# Patient Record
Sex: MALE | Race: WHITE | NOT HISPANIC OR LATINO | Employment: FULL TIME | URBAN - METROPOLITAN AREA
[De-identification: names, ages, dates, MRNs, and addresses within clinical notes are randomized per-mention and may not be internally consistent; named-entity substitution may affect disease eponyms.]

---

## 2022-07-26 ENCOUNTER — TELEPHONE (OUTPATIENT)
Dept: HEALTH INFORMATION MANAGEMENT | Facility: OTHER | Age: 37
End: 2022-07-26

## 2022-07-26 SDOH — ECONOMIC STABILITY: TRANSPORTATION INSECURITY
IN THE PAST 12 MONTHS, HAS THE LACK OF TRANSPORTATION KEPT YOU FROM MEDICAL APPOINTMENTS OR FROM GETTING MEDICATIONS?: NO

## 2022-07-26 SDOH — ECONOMIC STABILITY: HOUSING INSECURITY
IN THE LAST 12 MONTHS, WAS THERE A TIME WHEN YOU DID NOT HAVE A STEADY PLACE TO SLEEP OR SLEPT IN A SHELTER (INCLUDING NOW)?: NO

## 2022-07-26 SDOH — ECONOMIC STABILITY: FOOD INSECURITY: WITHIN THE PAST 12 MONTHS, THE FOOD YOU BOUGHT JUST DIDN'T LAST AND YOU DIDN'T HAVE MONEY TO GET MORE.: NEVER TRUE

## 2022-07-26 SDOH — ECONOMIC STABILITY: INCOME INSECURITY: IN THE LAST 12 MONTHS, WAS THERE A TIME WHEN YOU WERE NOT ABLE TO PAY THE MORTGAGE OR RENT ON TIME?: NO

## 2022-07-26 SDOH — ECONOMIC STABILITY: TRANSPORTATION INSECURITY
IN THE PAST 12 MONTHS, HAS LACK OF RELIABLE TRANSPORTATION KEPT YOU FROM MEDICAL APPOINTMENTS, MEETINGS, WORK OR FROM GETTING THINGS NEEDED FOR DAILY LIVING?: NO

## 2022-07-26 SDOH — HEALTH STABILITY: PHYSICAL HEALTH: ON AVERAGE, HOW MANY DAYS PER WEEK DO YOU ENGAGE IN MODERATE TO STRENUOUS EXERCISE (LIKE A BRISK WALK)?: 5 DAYS

## 2022-07-26 SDOH — ECONOMIC STABILITY: HOUSING INSECURITY: IN THE LAST 12 MONTHS, HOW MANY PLACES HAVE YOU LIVED?: 2

## 2022-07-26 SDOH — ECONOMIC STABILITY: FOOD INSECURITY: WITHIN THE PAST 12 MONTHS, YOU WORRIED THAT YOUR FOOD WOULD RUN OUT BEFORE YOU GOT MONEY TO BUY MORE.: NEVER TRUE

## 2022-07-26 SDOH — ECONOMIC STABILITY: TRANSPORTATION INSECURITY
IN THE PAST 12 MONTHS, HAS LACK OF TRANSPORTATION KEPT YOU FROM MEETINGS, WORK, OR FROM GETTING THINGS NEEDED FOR DAILY LIVING?: NO

## 2022-07-26 SDOH — ECONOMIC STABILITY: INCOME INSECURITY: HOW HARD IS IT FOR YOU TO PAY FOR THE VERY BASICS LIKE FOOD, HOUSING, MEDICAL CARE, AND HEATING?: NOT HARD AT ALL

## 2022-07-26 SDOH — HEALTH STABILITY: MENTAL HEALTH
STRESS IS WHEN SOMEONE FEELS TENSE, NERVOUS, ANXIOUS, OR CAN'T SLEEP AT NIGHT BECAUSE THEIR MIND IS TROUBLED. HOW STRESSED ARE YOU?: ONLY A LITTLE

## 2022-07-26 SDOH — HEALTH STABILITY: PHYSICAL HEALTH: ON AVERAGE, HOW MANY MINUTES DO YOU ENGAGE IN EXERCISE AT THIS LEVEL?: 40 MIN

## 2022-07-26 ASSESSMENT — SOCIAL DETERMINANTS OF HEALTH (SDOH)
WITHIN THE PAST 12 MONTHS, YOU WORRIED THAT YOUR FOOD WOULD RUN OUT BEFORE YOU GOT THE MONEY TO BUY MORE: NEVER TRUE
HOW HARD IS IT FOR YOU TO PAY FOR THE VERY BASICS LIKE FOOD, HOUSING, MEDICAL CARE, AND HEATING?: NOT HARD AT ALL
IN A TYPICAL WEEK, HOW MANY TIMES DO YOU TALK ON THE PHONE WITH FAMILY, FRIENDS, OR NEIGHBORS?: THREE TIMES A WEEK
HOW OFTEN DO YOU ATTENT MEETINGS OF THE CLUB OR ORGANIZATION YOU BELONG TO?: NEVER
HOW OFTEN DO YOU GET TOGETHER WITH FRIENDS OR RELATIVES?: NEVER
DO YOU BELONG TO ANY CLUBS OR ORGANIZATIONS SUCH AS CHURCH GROUPS UNIONS, FRATERNAL OR ATHLETIC GROUPS, OR SCHOOL GROUPS?: NO
HOW OFTEN DO YOU ATTEND CHURCH OR RELIGIOUS SERVICES?: NEVER
HOW OFTEN DO YOU HAVE SIX OR MORE DRINKS ON ONE OCCASION: NEVER
IN A TYPICAL WEEK, HOW MANY TIMES DO YOU TALK ON THE PHONE WITH FAMILY, FRIENDS, OR NEIGHBORS?: THREE TIMES A WEEK
HOW MANY DRINKS CONTAINING ALCOHOL DO YOU HAVE ON A TYPICAL DAY WHEN YOU ARE DRINKING: PATIENT DOES NOT DRINK
HOW OFTEN DO YOU HAVE A DRINK CONTAINING ALCOHOL: NEVER
DO YOU BELONG TO ANY CLUBS OR ORGANIZATIONS SUCH AS CHURCH GROUPS UNIONS, FRATERNAL OR ATHLETIC GROUPS, OR SCHOOL GROUPS?: NO
HOW OFTEN DO YOU ATTENT MEETINGS OF THE CLUB OR ORGANIZATION YOU BELONG TO?: NEVER
HOW OFTEN DO YOU ATTEND CHURCH OR RELIGIOUS SERVICES?: NEVER
HOW OFTEN DO YOU GET TOGETHER WITH FRIENDS OR RELATIVES?: NEVER

## 2022-07-26 ASSESSMENT — LIFESTYLE VARIABLES
HOW MANY STANDARD DRINKS CONTAINING ALCOHOL DO YOU HAVE ON A TYPICAL DAY: PATIENT DOES NOT DRINK
HOW OFTEN DO YOU HAVE SIX OR MORE DRINKS ON ONE OCCASION: NEVER
AUDIT-C TOTAL SCORE: 0
SKIP TO QUESTIONS 9-10: 1
HOW OFTEN DO YOU HAVE A DRINK CONTAINING ALCOHOL: NEVER

## 2022-07-27 ENCOUNTER — OFFICE VISIT (OUTPATIENT)
Dept: MEDICAL GROUP | Facility: PHYSICIAN GROUP | Age: 37
End: 2022-07-27
Payer: COMMERCIAL

## 2022-07-27 VITALS
BODY MASS INDEX: 26.51 KG/M2 | HEIGHT: 69 IN | OXYGEN SATURATION: 96 % | HEART RATE: 70 BPM | WEIGHT: 179 LBS | TEMPERATURE: 98.6 F | SYSTOLIC BLOOD PRESSURE: 114 MMHG | DIASTOLIC BLOOD PRESSURE: 72 MMHG | RESPIRATION RATE: 16 BRPM

## 2022-07-27 DIAGNOSIS — Z00.00 WELL ADULT EXAM: ICD-10-CM

## 2022-07-27 DIAGNOSIS — L70.0 ACNE VULGARIS: ICD-10-CM

## 2022-07-27 DIAGNOSIS — Z12.9 CANCER SCREENING: ICD-10-CM

## 2022-07-27 DIAGNOSIS — Z76.89 ENCOUNTER TO ESTABLISH CARE WITH NEW DOCTOR: ICD-10-CM

## 2022-07-27 DIAGNOSIS — F40.243 ANXIETY WITH FLYING: ICD-10-CM

## 2022-07-27 DIAGNOSIS — N20.0 KIDNEY STONE: ICD-10-CM

## 2022-07-27 PROCEDURE — 99204 OFFICE O/P NEW MOD 45 MIN: CPT | Performed by: INTERNAL MEDICINE

## 2022-07-27 RX ORDER — LORAZEPAM 0.5 MG/1
0.5 TABLET ORAL
Qty: 15 TABLET | Refills: 0 | Status: SHIPPED | OUTPATIENT
Start: 2022-07-27 | End: 2022-08-11

## 2022-07-27 RX ORDER — ERYTHROMYCIN 20 MG/ML
SOLUTION TOPICAL
Qty: 60 ML | Refills: 2 | Status: SHIPPED | OUTPATIENT
Start: 2022-07-27 | End: 2023-02-08

## 2022-07-27 RX ORDER — TAMSULOSIN HYDROCHLORIDE 0.4 MG/1
0.4 CAPSULE ORAL DAILY
COMMUNITY
Start: 2021-03-18 | End: 2022-07-27

## 2022-07-27 ASSESSMENT — FIBROSIS 4 INDEX: FIB4 SCORE: 0.98

## 2022-07-27 ASSESSMENT — PATIENT HEALTH QUESTIONNAIRE - PHQ9: CLINICAL INTERPRETATION OF PHQ2 SCORE: 0

## 2022-07-27 NOTE — ASSESSMENT & PLAN NOTE
Chronic condition.  The patient used to take lorazepam before flying.  He will go to [place] very soon.  The patient is requesting a refill

## 2022-07-27 NOTE — ASSESSMENT & PLAN NOTE
This is intermittent condition.  Patient reported he would get acne breakouts on his face typically after eating chocolate

## 2022-07-27 NOTE — PROGRESS NOTES
"PRIMARY CARE CLINIC VISIT  Chief Complaint   Patient presents with   • New Patient     Establish care  Refill for lorazepam  Acne    History of Present Illness     Encounter to establish care with new doctor  Patient presents today to establish care with new primary care provider    Acne vulgaris  This is intermittent condition.  Patient reported he would get acne breakouts on his face typically after eating chocolate    Anxiety with flying  Chronic condition.  The patient used to take lorazepam before flying.  He will go to [place] very soon.  The patient is requesting a refill    Kidney stone  Patient with history of kidney stone.  Currently the patient asymptomatic.      No current outpatient medications on file prior to visit.     No current facility-administered medications on file prior to visit.        Allergies: Patient has no known allergies.    ROS  As per HPI above. All other systems reviewed and negative.      Past Medical, Social, and Family history reviewed and updated in EPIC     Objective     /72 (BP Location: Right arm, Patient Position: Sitting, BP Cuff Size: Adult)   Pulse 70   Temp 37 °C (98.6 °F) (Temporal)   Resp 16   Ht 1.753 m (5' 9\")   Wt 81.2 kg (179 lb)   SpO2 96%    Body mass index is 26.43 kg/m².    General: alert in no apparent distress.  Cardiovascular: regular rate and rhythm  Pulmonary: lungs : no wheezing   Gastrointestinal: BS present. No obvious mass noted          Assessment and Plan     1. Encounter to establish care with new doctor    2. Anxiety with flying  Chronic condition.  Stable.  Refill for lorazepam to take 1 hour before flying.   reviewed, appropriate.  Patient signed the consent form for controlled substance agreement today.  - LORazepam (ATIVAN) 0.5 MG Tab; Take 1 Tablet by mouth 1 time a day as needed for Anxiety for up to 15 days.  Dispense: 15 Tablet; Refill: 0  - Controlled Substance Treatment Agreement    I have reviewed the medical records and have " determined that a controlled substance treatment is medically indicated:    I have reviewed pt's prescription history as maintained by the [place] Prescription Monitoring Program.     Given the above, I believe the benefits of controlled substance therapy outweigh the risks.   Potential side effects of the medication discussed with the patient including but not limited to: Sedation, drowsiness, depression, headaches, constipation, diarrhea, dry mouth, impaired coordination, fatigue, memory impairment, irritability, cognitive disorder, insomnia, lightheadedness, changes in bowel movement, nausea/vomiting, tachycardia, confusion, blurred vision, hypotension, syncope, etc.  Also discussed with the patient regarding potential drug interactions with other medications  Accordingly, I have discussed the risk and benefits, treatment plan, and alternative therapies with the patient.     3. Kidney stone  Currently the patient asymptomatic.  Advised the patient stay well-hydrated.  Continue to monitor  4. Acne vulgaris  Chronic condition.  Prescribed erythromycin ointment applied to affected area twice daily as needed.    Other orders  - erythromycin with ethanol (THERAMYCIN) 2 % external solution; Apply to affected areas bid.  Dispense: 60 mL; Refill: 2      5. Cancer screening  Patient also interested in the [employer] [place] project.  Referral submitted today.- Referral to Genetic Research Studies    6. Well adult exam  - HEMOGLOBIN A1C; Future  - ALANINE AMINO-TRANS; Future  - Basic Metabolic Panel; Future  - CBC WITHOUT DIFFERENTIAL; Future  - Lipid Profile; Future  - TSH; Future  Routine lab work ordered.  Advised the patient to follow-up a few days after lab test done.                      Healthcare Maintenance     There are no preventive care reminders to display for this patient.            Please note that this dictation was created using voice recognition software. I have made every reasonable attempt to correct  obvious errors, but I expect that there are errors of grammar and possibly content that I did not discover before finalizing the note.    Robert Fernandez MD  Internal Medicine  [facility]

## 2023-02-07 ENCOUNTER — HOSPITAL ENCOUNTER (OUTPATIENT)
Dept: LAB | Facility: MEDICAL CENTER | Age: 38
End: 2023-02-07
Attending: INTERNAL MEDICINE
Payer: COMMERCIAL

## 2023-02-07 DIAGNOSIS — Z00.00 WELL ADULT EXAM: ICD-10-CM

## 2023-02-07 LAB
ALT SERPL-CCNC: 16 U/L (ref 2–50)
ANION GAP SERPL CALC-SCNC: 10 MMOL/L (ref 7–16)
BUN SERPL-MCNC: 12 MG/DL (ref 8–22)
CALCIUM SERPL-MCNC: 9.9 MG/DL (ref 8.5–10.5)
CHLORIDE SERPL-SCNC: 104 MMOL/L (ref 96–112)
CHOLEST SERPL-MCNC: 210 MG/DL (ref 100–199)
CO2 SERPL-SCNC: 26 MMOL/L (ref 20–33)
CREAT SERPL-MCNC: 0.89 MG/DL (ref 0.5–1.4)
EST. AVERAGE GLUCOSE BLD GHB EST-MCNC: 123 MG/DL
FASTING STATUS PATIENT QL REPORTED: NORMAL
GFR SERPLBLD CREATININE-BSD FMLA CKD-EPI: 112 ML/MIN/1.73 M 2
GLUCOSE SERPL-MCNC: 97 MG/DL (ref 65–99)
HBA1C MFR BLD: 5.9 % (ref 4–5.6)
HDLC SERPL-MCNC: 46 MG/DL
LDLC SERPL CALC-MCNC: 143 MG/DL
POTASSIUM SERPL-SCNC: 4.9 MMOL/L (ref 3.6–5.5)
SODIUM SERPL-SCNC: 140 MMOL/L (ref 135–145)
TRIGL SERPL-MCNC: 104 MG/DL (ref 0–149)
TSH SERPL DL<=0.005 MIU/L-ACNC: 1.23 UIU/ML (ref 0.38–5.33)

## 2023-02-07 PROCEDURE — 80048 BASIC METABOLIC PNL TOTAL CA: CPT

## 2023-02-07 PROCEDURE — 80061 LIPID PANEL: CPT

## 2023-02-07 PROCEDURE — 84443 ASSAY THYROID STIM HORMONE: CPT

## 2023-02-07 PROCEDURE — 83036 HEMOGLOBIN GLYCOSYLATED A1C: CPT

## 2023-02-07 PROCEDURE — 36415 COLL VENOUS BLD VENIPUNCTURE: CPT

## 2023-02-07 PROCEDURE — 84460 ALANINE AMINO (ALT) (SGPT): CPT

## 2023-02-08 ENCOUNTER — OFFICE VISIT (OUTPATIENT)
Dept: MEDICAL GROUP | Facility: PHYSICIAN GROUP | Age: 38
End: 2023-02-08
Payer: COMMERCIAL

## 2023-02-08 VITALS
DIASTOLIC BLOOD PRESSURE: 62 MMHG | HEIGHT: 69 IN | TEMPERATURE: 99 F | WEIGHT: 173 LBS | HEART RATE: 86 BPM | OXYGEN SATURATION: 96 % | BODY MASS INDEX: 25.62 KG/M2 | SYSTOLIC BLOOD PRESSURE: 114 MMHG | RESPIRATION RATE: 16 BRPM

## 2023-02-08 DIAGNOSIS — R73.03 PREDIABETES: ICD-10-CM

## 2023-02-08 DIAGNOSIS — E78.5 DYSLIPIDEMIA: ICD-10-CM

## 2023-02-08 DIAGNOSIS — F40.243 ANXIETY WITH FLYING: ICD-10-CM

## 2023-02-08 DIAGNOSIS — N20.0 KIDNEY STONE: ICD-10-CM

## 2023-02-08 DIAGNOSIS — H02.9 LESION OF LEFT UPPER EYELID: ICD-10-CM

## 2023-02-08 DIAGNOSIS — L70.0 ACNE VULGARIS: ICD-10-CM

## 2023-02-08 PROBLEM — H57.9 EYE LESION: Status: ACTIVE | Noted: 2023-02-08

## 2023-02-08 PROCEDURE — 99214 OFFICE O/P EST MOD 30 MIN: CPT | Performed by: INTERNAL MEDICINE

## 2023-02-08 ASSESSMENT — PATIENT HEALTH QUESTIONNAIRE - PHQ9: CLINICAL INTERPRETATION OF PHQ2 SCORE: 0

## 2023-02-08 ASSESSMENT — FIBROSIS 4 INDEX: FIB4 SCORE: 1.01

## 2023-02-09 NOTE — ASSESSMENT & PLAN NOTE
Chronic condition uncontrolled    Lab test results discussed with the patient.  Recommended low-fat low-cholesterol diet.  Recommend to repeat the blood test again approximately 3 to 4 months.

## 2023-02-09 NOTE — ASSESSMENT & PLAN NOTE
Chronic stable condition.  The patient takes lorazepam as needed for flying.   Symptoms are stable at the present time.

## 2023-02-09 NOTE — PROGRESS NOTES
PRIMARY CARE CLINIC VISIT    Chief complaint:    Lesion left upper eyelid  Follow-up hyperlipidemia and prediabetes  Acne  Anxiety  Kidney stone    History of Present Illness     Lesion of left upper eyelid  This is a new condition.  The patient and care the patient was not sure the duration  He denies fever chills pain or discomfort.  He denies change in vision.  Patient denies recent trauma or injury.    Dyslipidemia  Chronic condition uncontrolled    Lab test results discussed with the patient.  Recommended low-fat low-cholesterol diet.  Recommend to repeat the blood test again approximately 3 to 4 months.    Prediabetes  Chronic condition noted with recent blood test.  Results discussed with the patient.  Recommended low sweet low-carb diet and regular exercise activity.  Patient to repeat blood test in 3 to 4 months.      Acne vulgaris  This is a chronic condition.  The patient no longer use erythromycin for his symptoms well controlled    Anxiety with flying  Chronic stable condition.  The patient takes lorazepam as needed for flying.   Symptoms are stable at the present time.    Kidney stone  This is a chronic and stable condition.   Patient denied recent flareup.    No current outpatient medications on file prior to visit.     No current facility-administered medications on file prior to visit.        Allergies: Patient has no known allergies.    No current Epic-ordered outpatient medications on file.     No current Epic-ordered facility-administered medications on file.       No past medical history on file.    Past Surgical History:   Procedure Laterality Date    CHOLECYSTECTOMY         Family History   Problem Relation Age of Onset    Cancer Mother         ovarian    Cancer Paternal Uncle        Social History     Tobacco Use   Smoking Status Former    Packs/day: 0.50    Types: Cigarettes    Quit date:     Years since quittin.1   Smokeless Tobacco Never       Social History     Substance and  "Sexual Activity   Alcohol Use Not Currently       Review of systems.  As per HPI above. All other systems reviewed and negative.      Past Medical, Social, and Family history reviewed and updated in EPIC     Objective     /62 (BP Location: Left arm, Patient Position: Sitting, BP Cuff Size: Adult)   Pulse 86   Temp 37.2 °C (99 °F) (Temporal)   Resp 16   Ht 1.753 m (5' 9\")   Wt 78.5 kg (173 lb)   SpO2 96%    Body mass index is 25.55 kg/m².    General: alert in no apparent distress.  Cardiovascular: regular rate and rhythm  Pulmonary: lungs : no wheezing   Gastrointestinal: BS present. No obvious mass noted  Left upper eyelid there is a papular lesion slightly raised measuring approximately 4 x 5 mm in size.  No fluctuance or discharge noted.      Lab Results   Component Value Date/Time    HBA1C 5.9 (H) 02/07/2023 07:03 AM       Lab Results   Component Value Date/Time    HEMOGLOBIN 13.1 03/18/2021 10:15 PM    HEMATOCRIT 39.3 03/18/2021 10:15 PM    MCV 92 03/18/2021 10:15 PM    PLATELETCT 234 03/18/2021 10:15 PM         Lab Results   Component Value Date/Time    SODIUM 140 02/07/2023 07:03 AM    POTASSIUM 4.9 02/07/2023 07:03 AM    GLUCOSE 97 02/07/2023 07:03 AM    BUN 12 02/07/2023 07:03 AM    CREATININE 0.89 02/07/2023 07:03 AM       Lab Results   Component Value Date/Time    CHOLSTRLTOT 210 (H) 02/07/2023 07:03 AM     (H) 02/07/2023 07:03 AM    HDL 46 02/07/2023 07:03 AM    TRIGLYCERIDE 104 02/07/2023 07:03 AM       Lab Results   Component Value Date/Time    ALTSGPT 16 02/07/2023 07:03 AM             Assessment and Plan     1. Lesion of left upper eyelid  This is a new condition.  The cause is unclear needing further investigation.  Rule out neoplastic causes    - Referral to Ophthalmology for consultation    2. Prediabetes  Chronic condition.  Uncontrolled.  Recommend diet and exercise.  Recommend low sweet low-carb diet.  Recommend follow-up in 3 months to repeat the blood test.  - Basic " Metabolic Panel; Future  - HEMOGLOBIN A1C; Future    3. Dyslipidemia  Chronic condition.  Uncontrolled.  Lipid panel result discussed with patient.  Recommended low-fat low-cholesterol diet.  Recommend patient to start regular exercise activity.  Recommend follow-up in 3 months to repeat the blood test for follow-up trends.    4. Acne vulgaris  Chronic stable condition.  Currently no significant acne is noted on exam.  Continue to monitor.    5. Anxiety with flying  Chronic stable condition.  Patient may continue to take lorazepam 1 hour before flying.  Continue to monitor.    6. Kidney stone  Chronic stable condition.  Patient currently asymptomatic.  Advised the patient to be sure to stay well-hydrated.  Continue to monitor.                    Please note that this dictation was created using voice recognition software. I have made every reasonable attempt to correct obvious errors, but I expect that there are errors of grammar and possibly content that I did not discover before finalizing the note.    Robert Fernandez MD  Internal Medicine  [facility]

## 2023-02-09 NOTE — ASSESSMENT & PLAN NOTE
This is a new condition.  The patient and care the patient was not sure the duration  He denies fever chills pain or discomfort.  He denies change in vision.  Patient denies recent trauma or injury.

## 2023-02-09 NOTE — ASSESSMENT & PLAN NOTE
This is a chronic condition.  The patient no longer use erythromycin for his symptoms well controlled

## 2023-02-09 NOTE — ASSESSMENT & PLAN NOTE
Chronic condition noted with recent blood test.  Results discussed with the patient.  Recommended low sweet low-carb diet and regular exercise activity.  Patient to repeat blood test in 3 to 4 months.

## 2023-07-11 ENCOUNTER — HOSPITAL ENCOUNTER (OUTPATIENT)
Dept: LAB | Facility: MEDICAL CENTER | Age: 38
End: 2023-07-11
Attending: INTERNAL MEDICINE
Payer: COMMERCIAL

## 2023-07-11 DIAGNOSIS — R73.03 PREDIABETES: ICD-10-CM

## 2023-07-11 DIAGNOSIS — E78.5 DYSLIPIDEMIA: ICD-10-CM

## 2023-07-11 LAB
ANION GAP SERPL CALC-SCNC: 9 MMOL/L (ref 7–16)
BUN SERPL-MCNC: 18 MG/DL (ref 8–22)
CALCIUM SERPL-MCNC: 9.5 MG/DL (ref 8.5–10.5)
CHLORIDE SERPL-SCNC: 102 MMOL/L (ref 96–112)
CHOLEST SERPL-MCNC: 207 MG/DL (ref 100–199)
CO2 SERPL-SCNC: 27 MMOL/L (ref 20–33)
CREAT SERPL-MCNC: 0.96 MG/DL (ref 0.5–1.4)
EST. AVERAGE GLUCOSE BLD GHB EST-MCNC: 126 MG/DL
FASTING STATUS PATIENT QL REPORTED: NORMAL
GFR SERPLBLD CREATININE-BSD FMLA CKD-EPI: 103 ML/MIN/1.73 M 2
GLUCOSE SERPL-MCNC: 107 MG/DL (ref 65–99)
HBA1C MFR BLD: 6 % (ref 4–5.6)
HDLC SERPL-MCNC: 48 MG/DL
LDLC SERPL CALC-MCNC: 145 MG/DL
POTASSIUM SERPL-SCNC: 4.6 MMOL/L (ref 3.6–5.5)
SODIUM SERPL-SCNC: 138 MMOL/L (ref 135–145)
TRIGL SERPL-MCNC: 71 MG/DL (ref 0–149)

## 2023-07-11 PROCEDURE — 80048 BASIC METABOLIC PNL TOTAL CA: CPT

## 2023-07-11 PROCEDURE — 80061 LIPID PANEL: CPT

## 2023-07-11 PROCEDURE — 36415 COLL VENOUS BLD VENIPUNCTURE: CPT

## 2023-07-11 PROCEDURE — 83036 HEMOGLOBIN GLYCOSYLATED A1C: CPT

## 2023-08-09 DIAGNOSIS — E78.5 DYSLIPIDEMIA: Chronic | ICD-10-CM

## 2023-08-09 DIAGNOSIS — Z13.0 SCREENING FOR DEFICIENCY ANEMIA: ICD-10-CM

## 2023-08-09 DIAGNOSIS — R73.03 PREDIABETES: Chronic | ICD-10-CM

## 2023-08-10 ENCOUNTER — HOSPITAL ENCOUNTER (OUTPATIENT)
Dept: LAB | Facility: MEDICAL CENTER | Age: 38
End: 2023-08-10
Attending: INTERNAL MEDICINE
Payer: COMMERCIAL

## 2023-08-10 DIAGNOSIS — R73.03 PREDIABETES: Chronic | ICD-10-CM

## 2023-08-10 DIAGNOSIS — Z13.0 SCREENING FOR DEFICIENCY ANEMIA: ICD-10-CM

## 2023-08-10 DIAGNOSIS — E78.5 DYSLIPIDEMIA: Chronic | ICD-10-CM

## 2023-08-10 LAB
ALBUMIN SERPL BCP-MCNC: 4.6 G/DL (ref 3.2–4.9)
ALBUMIN/GLOB SERPL: 1.7 G/DL
ALP SERPL-CCNC: 54 U/L (ref 30–99)
ALT SERPL-CCNC: 15 U/L (ref 2–50)
ANION GAP SERPL CALC-SCNC: 12 MMOL/L (ref 7–16)
AST SERPL-CCNC: 21 U/L (ref 12–45)
BASOPHILS # BLD AUTO: 1.1 % (ref 0–1.8)
BASOPHILS # BLD: 0.06 K/UL (ref 0–0.12)
BILIRUB SERPL-MCNC: 0.5 MG/DL (ref 0.1–1.5)
BUN SERPL-MCNC: 17 MG/DL (ref 8–22)
CALCIUM ALBUM COR SERPL-MCNC: 9.4 MG/DL (ref 8.5–10.5)
CALCIUM SERPL-MCNC: 9.9 MG/DL (ref 8.5–10.5)
CHLORIDE SERPL-SCNC: 101 MMOL/L (ref 96–112)
CHOLEST SERPL-MCNC: 191 MG/DL (ref 100–199)
CO2 SERPL-SCNC: 25 MMOL/L (ref 20–33)
CREAT SERPL-MCNC: 1.05 MG/DL (ref 0.5–1.4)
EOSINOPHIL # BLD AUTO: 0.06 K/UL (ref 0–0.51)
EOSINOPHIL NFR BLD: 1.1 % (ref 0–6.9)
ERYTHROCYTE [DISTWIDTH] IN BLOOD BY AUTOMATED COUNT: 42.7 FL (ref 35.9–50)
EST. AVERAGE GLUCOSE BLD GHB EST-MCNC: 123 MG/DL
FASTING STATUS PATIENT QL REPORTED: NORMAL
GFR SERPLBLD CREATININE-BSD FMLA CKD-EPI: 92 ML/MIN/1.73 M 2
GLOBULIN SER CALC-MCNC: 2.7 G/DL (ref 1.9–3.5)
GLUCOSE SERPL-MCNC: 97 MG/DL (ref 65–99)
HBA1C MFR BLD: 5.9 % (ref 4–5.6)
HCT VFR BLD AUTO: 47.5 % (ref 42–52)
HDLC SERPL-MCNC: 48 MG/DL
HGB BLD-MCNC: 15.7 G/DL (ref 14–18)
IMM GRANULOCYTES # BLD AUTO: 0.03 K/UL (ref 0–0.11)
IMM GRANULOCYTES NFR BLD AUTO: 0.6 % (ref 0–0.9)
LDLC SERPL CALC-MCNC: 133 MG/DL
LYMPHOCYTES # BLD AUTO: 2.36 K/UL (ref 1–4.8)
LYMPHOCYTES NFR BLD: 44.1 % (ref 22–41)
MCH RBC QN AUTO: 30.7 PG (ref 27–33)
MCHC RBC AUTO-ENTMCNC: 33.1 G/DL (ref 32.3–36.5)
MCV RBC AUTO: 93 FL (ref 81.4–97.8)
MONOCYTES # BLD AUTO: 0.59 K/UL (ref 0–0.85)
MONOCYTES NFR BLD AUTO: 11 % (ref 0–13.4)
NEUTROPHILS # BLD AUTO: 2.25 K/UL (ref 1.82–7.42)
NEUTROPHILS NFR BLD: 42.1 % (ref 44–72)
NRBC # BLD AUTO: 0 K/UL
NRBC BLD-RTO: 0 /100 WBC (ref 0–0.2)
PLATELET # BLD AUTO: 257 K/UL (ref 164–446)
PMV BLD AUTO: 10.7 FL (ref 9–12.9)
POTASSIUM SERPL-SCNC: 4.7 MMOL/L (ref 3.6–5.5)
PROT SERPL-MCNC: 7.3 G/DL (ref 6–8.2)
RBC # BLD AUTO: 5.11 M/UL (ref 4.7–6.1)
SODIUM SERPL-SCNC: 138 MMOL/L (ref 135–145)
TRIGL SERPL-MCNC: 48 MG/DL (ref 0–149)
WBC # BLD AUTO: 5.4 K/UL (ref 4.8–10.8)

## 2023-08-10 PROCEDURE — 85025 COMPLETE CBC W/AUTO DIFF WBC: CPT

## 2023-08-10 PROCEDURE — 80053 COMPREHEN METABOLIC PANEL: CPT

## 2023-08-10 PROCEDURE — 36415 COLL VENOUS BLD VENIPUNCTURE: CPT

## 2023-08-10 PROCEDURE — 80061 LIPID PANEL: CPT

## 2023-08-10 PROCEDURE — 83036 HEMOGLOBIN GLYCOSYLATED A1C: CPT

## 2023-09-02 SDOH — ECONOMIC STABILITY: FOOD INSECURITY: WITHIN THE PAST 12 MONTHS, THE FOOD YOU BOUGHT JUST DIDN'T LAST AND YOU DIDN'T HAVE MONEY TO GET MORE.: NEVER TRUE

## 2023-09-02 SDOH — HEALTH STABILITY: PHYSICAL HEALTH: ON AVERAGE, HOW MANY MINUTES DO YOU ENGAGE IN EXERCISE AT THIS LEVEL?: 40 MIN

## 2023-09-02 SDOH — ECONOMIC STABILITY: FOOD INSECURITY: WITHIN THE PAST 12 MONTHS, YOU WORRIED THAT YOUR FOOD WOULD RUN OUT BEFORE YOU GOT MONEY TO BUY MORE.: NEVER TRUE

## 2023-09-02 SDOH — ECONOMIC STABILITY: HOUSING INSECURITY: IN THE LAST 12 MONTHS, HOW MANY PLACES HAVE YOU LIVED?: 1

## 2023-09-02 SDOH — ECONOMIC STABILITY: INCOME INSECURITY: IN THE LAST 12 MONTHS, WAS THERE A TIME WHEN YOU WERE NOT ABLE TO PAY THE MORTGAGE OR RENT ON TIME?: NO

## 2023-09-02 SDOH — HEALTH STABILITY: PHYSICAL HEALTH: ON AVERAGE, HOW MANY DAYS PER WEEK DO YOU ENGAGE IN MODERATE TO STRENUOUS EXERCISE (LIKE A BRISK WALK)?: 4 DAYS

## 2023-09-02 SDOH — ECONOMIC STABILITY: INCOME INSECURITY: HOW HARD IS IT FOR YOU TO PAY FOR THE VERY BASICS LIKE FOOD, HOUSING, MEDICAL CARE, AND HEATING?: NOT HARD AT ALL

## 2023-09-02 ASSESSMENT — LIFESTYLE VARIABLES
SKIP TO QUESTIONS 9-10: 1
AUDIT-C TOTAL SCORE: 0
HOW MANY STANDARD DRINKS CONTAINING ALCOHOL DO YOU HAVE ON A TYPICAL DAY: PATIENT DOES NOT DRINK
HOW OFTEN DO YOU HAVE A DRINK CONTAINING ALCOHOL: NEVER
HOW OFTEN DO YOU HAVE SIX OR MORE DRINKS ON ONE OCCASION: NEVER

## 2023-09-02 ASSESSMENT — SOCIAL DETERMINANTS OF HEALTH (SDOH)
HOW OFTEN DO YOU GET TOGETHER WITH FRIENDS OR RELATIVES?: NEVER
IN A TYPICAL WEEK, HOW MANY TIMES DO YOU TALK ON THE PHONE WITH FAMILY, FRIENDS, OR NEIGHBORS?: ONCE A WEEK
WITHIN THE PAST 12 MONTHS, YOU WORRIED THAT YOUR FOOD WOULD RUN OUT BEFORE YOU GOT THE MONEY TO BUY MORE: NEVER TRUE
DO YOU BELONG TO ANY CLUBS OR ORGANIZATIONS SUCH AS CHURCH GROUPS UNIONS, FRATERNAL OR ATHLETIC GROUPS, OR SCHOOL GROUPS?: NO
HOW OFTEN DO YOU ATTEND CHURCH OR RELIGIOUS SERVICES?: NEVER
HOW OFTEN DO YOU GET TOGETHER WITH FRIENDS OR RELATIVES?: NEVER
HOW HARD IS IT FOR YOU TO PAY FOR THE VERY BASICS LIKE FOOD, HOUSING, MEDICAL CARE, AND HEATING?: NOT HARD AT ALL
DO YOU BELONG TO ANY CLUBS OR ORGANIZATIONS SUCH AS CHURCH GROUPS UNIONS, FRATERNAL OR ATHLETIC GROUPS, OR SCHOOL GROUPS?: NO
HOW OFTEN DO YOU ATTENT MEETINGS OF THE CLUB OR ORGANIZATION YOU BELONG TO?: NEVER
HOW OFTEN DO YOU ATTEND CHURCH OR RELIGIOUS SERVICES?: NEVER
IN A TYPICAL WEEK, HOW MANY TIMES DO YOU TALK ON THE PHONE WITH FAMILY, FRIENDS, OR NEIGHBORS?: ONCE A WEEK
HOW OFTEN DO YOU HAVE SIX OR MORE DRINKS ON ONE OCCASION: NEVER
HOW MANY DRINKS CONTAINING ALCOHOL DO YOU HAVE ON A TYPICAL DAY WHEN YOU ARE DRINKING: PATIENT DOES NOT DRINK
HOW OFTEN DO YOU ATTENT MEETINGS OF THE CLUB OR ORGANIZATION YOU BELONG TO?: NEVER
HOW OFTEN DO YOU HAVE A DRINK CONTAINING ALCOHOL: NEVER

## 2023-09-05 ENCOUNTER — OFFICE VISIT (OUTPATIENT)
Dept: MEDICAL GROUP | Facility: PHYSICIAN GROUP | Age: 38
End: 2023-09-05
Payer: COMMERCIAL

## 2023-09-05 VITALS
DIASTOLIC BLOOD PRESSURE: 76 MMHG | TEMPERATURE: 98.2 F | RESPIRATION RATE: 16 BRPM | HEART RATE: 74 BPM | WEIGHT: 166.4 LBS | OXYGEN SATURATION: 98 % | HEIGHT: 70 IN | BODY MASS INDEX: 23.82 KG/M2 | SYSTOLIC BLOOD PRESSURE: 102 MMHG

## 2023-09-05 DIAGNOSIS — Z12.11 COLON CANCER SCREENING: ICD-10-CM

## 2023-09-05 DIAGNOSIS — R63.39 SENSORY AVERSION TO PARTICULAR FOOD: ICD-10-CM

## 2023-09-05 DIAGNOSIS — R73.03 PREDIABETES: Chronic | ICD-10-CM

## 2023-09-05 DIAGNOSIS — Z83.719 FAMILY HISTORY OF COLONIC POLYPS: ICD-10-CM

## 2023-09-05 PROCEDURE — 99214 OFFICE O/P EST MOD 30 MIN: CPT

## 2023-09-05 PROCEDURE — 3078F DIAST BP <80 MM HG: CPT

## 2023-09-05 PROCEDURE — 3074F SYST BP LT 130 MM HG: CPT

## 2023-09-05 ASSESSMENT — FIBROSIS 4 INDEX: FIB4 SCORE: 0.82

## 2023-09-05 NOTE — PROGRESS NOTES
"CC:   Chief Complaint   Patient presents with    Establish Care     Wants to discuss blood sugars        HISTORY OF PRESENT ILLNESS: Patient is a 38 y.o. male established patient who presents today to discuss the following problems below:     Prediabetes  Patient presents to follow-up and discuss his prediabetes.  He has several questions today about eating habits, changes he has previously made, and changes he is currently working on.  He does struggle quite a bit with his intake of fruits and vegetables secondary to a problem with textures of these foods.  For example, he reports that he is able to drink apple juice, which she is cut out secondary to prediabetes, but cannot handle the texture of apples.    Review of Systems: Otherwise negative except for as stated above.      Exam: /76   Pulse 74   Temp 36.8 °C (98.2 °F) (Temporal)   Resp 16   Ht 1.778 m (5' 10\")   Wt 75.5 kg (166 lb 6.4 oz)   SpO2 98%  Body mass index is 23.88 kg/m².    Physical Exam  Vitals reviewed.   Constitutional:       Appearance: Normal appearance.   Eyes:      Extraocular Movements: Extraocular movements intact.   Pulmonary:      Effort: Pulmonary effort is normal.   Neurological:      General: No focal deficit present.      Mental Status: He is alert and oriented to person, place, and time.   Psychiatric:         Mood and Affect: Mood normal.         Behavior: Behavior normal.       Assessment/Plan:  38 y.o. male with the following -    1. Colon cancer screening  - COLOGUARD COLON CANCER SCREENING    2. Family history of colonic polyps  - COLOGUARD COLON CANCER SCREENING    3. Sensory aversion to particular food  Chronic issue, not previously discussed and has not been fully evaluated.  Patient wonders if he has some sort of an eating disorder, I am more suspicious of a sensory processing disorder.  Referral placed to [facility] for further evaluation  - Referral to Psychology    4. Prediabetes  Chronic, stable.  " Extensively educated on mechanism of insulin resistance, carbohydrate management, and dietary patterns. Plan to recheck A1c in October.   - HEMOGLOBIN A1C; Future      Follow-up: Return in about 3 months (around 12/5/2023). Family history of colon cancer and precancerous polyps     Health Maintenance: Completed    My total time spent caring for the patient on the day of the encounter was 34 minutes. This includes education and questions from both the patient and his spouse regarding lifestyle management of DM II, addressing barriers to healthier food options, etc.    This does not include time spent on separately billable procedures/tests.      Please note that this dictation was created using voice recognition software. I have made every reasonable attempt to correct obvious errors, but I expect that there are errors of grammar and possibly content that I did not discover before finalizing the note.    Electronically signed by KEILA Alvarado on September 5, 2023

## 2023-09-12 NOTE — ASSESSMENT & PLAN NOTE
Patient presents to follow-up and discuss his prediabetes.  He has several questions today about eating habits, changes he has previously made, and changes he is currently working on.  He does struggle quite a bit with his intake of fruits and vegetables secondary to a problem with textures of these foods.  For example, he reports that he is able to drink apple juice, which she is cut out secondary to prediabetes, but cannot handle the texture of apples.

## 2023-09-21 ENCOUNTER — TELEPHONE (OUTPATIENT)
Dept: MEDICAL GROUP | Facility: PHYSICIAN GROUP | Age: 38
End: 2023-09-21
Payer: COMMERCIAL

## 2023-09-21 NOTE — TELEPHONE ENCOUNTER
Received a letter from [facility] informing us pts insurance may not cover the coluard due to age

## 2023-10-11 ENCOUNTER — HOSPITAL ENCOUNTER (OUTPATIENT)
Dept: LAB | Facility: MEDICAL CENTER | Age: 38
End: 2023-10-11
Payer: COMMERCIAL

## 2023-10-11 DIAGNOSIS — R73.03 PREDIABETES: Chronic | ICD-10-CM

## 2023-10-11 LAB
EST. AVERAGE GLUCOSE BLD GHB EST-MCNC: 117 MG/DL
HBA1C MFR BLD: 5.7 % (ref 4–5.6)

## 2023-10-11 PROCEDURE — 83036 HEMOGLOBIN GLYCOSYLATED A1C: CPT

## 2023-10-11 PROCEDURE — 36415 COLL VENOUS BLD VENIPUNCTURE: CPT

## 2023-12-15 ENCOUNTER — APPOINTMENT (OUTPATIENT)
Dept: URGENT CARE | Facility: PHYSICIAN GROUP | Age: 38
End: 2023-12-15
Payer: COMMERCIAL

## 2023-12-15 ENCOUNTER — OFFICE VISIT (OUTPATIENT)
Dept: URGENT CARE | Facility: PHYSICIAN GROUP | Age: 38
End: 2023-12-15
Payer: COMMERCIAL

## 2023-12-15 VITALS
HEART RATE: 87 BPM | TEMPERATURE: 98 F | DIASTOLIC BLOOD PRESSURE: 76 MMHG | HEIGHT: 69 IN | WEIGHT: 171.4 LBS | SYSTOLIC BLOOD PRESSURE: 114 MMHG | OXYGEN SATURATION: 95 % | RESPIRATION RATE: 16 BRPM | BODY MASS INDEX: 25.39 KG/M2

## 2023-12-15 DIAGNOSIS — U07.1 COVID-19: ICD-10-CM

## 2023-12-15 LAB
FLUAV RNA SPEC QL NAA+PROBE: NEGATIVE
FLUBV RNA SPEC QL NAA+PROBE: NEGATIVE
RSV RNA SPEC QL NAA+PROBE: NEGATIVE
SARS-COV-2 RNA RESP QL NAA+PROBE: POSITIVE

## 2023-12-15 PROCEDURE — 99213 OFFICE O/P EST LOW 20 MIN: CPT | Performed by: PHYSICIAN ASSISTANT

## 2023-12-15 PROCEDURE — 0241U POCT CEPHEID COV-2, FLU A/B, RSV - PCR: CPT | Performed by: PHYSICIAN ASSISTANT

## 2023-12-15 PROCEDURE — 3074F SYST BP LT 130 MM HG: CPT | Performed by: PHYSICIAN ASSISTANT

## 2023-12-15 PROCEDURE — 3078F DIAST BP <80 MM HG: CPT | Performed by: PHYSICIAN ASSISTANT

## 2023-12-15 RX ORDER — DEXTROMETHORPHAN HYDROBROMIDE AND PROMETHAZINE HYDROCHLORIDE 15; 6.25 MG/5ML; MG/5ML
5 SYRUP ORAL EVERY 4 HOURS PRN
Qty: 120 ML | Refills: 0 | Status: SHIPPED | OUTPATIENT
Start: 2023-12-15

## 2023-12-15 ASSESSMENT — ENCOUNTER SYMPTOMS
DIAPHORESIS: 0
SORE THROAT: 1
NAUSEA: 0
COUGH: 1
MYALGIAS: 1
PALPITATIONS: 0
SINUS PAIN: 0
HEADACHES: 1
SPUTUM PRODUCTION: 0
DIARRHEA: 0
SHORTNESS OF BREATH: 0
ABDOMINAL PAIN: 0
CHILLS: 1
DIZZINESS: 0
FEVER: 1
WHEEZING: 0
VOMITING: 0

## 2023-12-15 ASSESSMENT — FIBROSIS 4 INDEX: FIB4 SCORE: 0.82

## 2024-04-08 ENCOUNTER — HOSPITAL ENCOUNTER (OUTPATIENT)
Dept: LAB | Facility: MEDICAL CENTER | Age: 39
End: 2024-04-08
Payer: COMMERCIAL

## 2024-04-08 ENCOUNTER — APPOINTMENT (OUTPATIENT)
Dept: MEDICAL GROUP | Facility: PHYSICIAN GROUP | Age: 39
End: 2024-04-08
Payer: COMMERCIAL

## 2024-04-08 VITALS
TEMPERATURE: 98.7 F | OXYGEN SATURATION: 96 % | HEIGHT: 69 IN | BODY MASS INDEX: 24.88 KG/M2 | HEART RATE: 94 BPM | RESPIRATION RATE: 18 BRPM | WEIGHT: 168 LBS | SYSTOLIC BLOOD PRESSURE: 96 MMHG | DIASTOLIC BLOOD PRESSURE: 60 MMHG

## 2024-04-08 DIAGNOSIS — R73.03 PREDIABETES: Chronic | ICD-10-CM

## 2024-04-08 DIAGNOSIS — R21 RASH: ICD-10-CM

## 2024-04-08 DIAGNOSIS — Z12.11 COLON CANCER SCREENING: ICD-10-CM

## 2024-04-08 LAB
C4 SERPL-MCNC: 20.4 MG/DL (ref 19–52)
HBA1C MFR BLD: 5.6 % (ref ?–5.8)
HIV 1+2 AB+HIV1 P24 AG SERPL QL IA: NORMAL
POCT INT CON NEG: NEGATIVE
POCT INT CON POS: POSITIVE

## 2024-04-08 PROCEDURE — 82595 ASSAY OF CRYOGLOBULIN: CPT

## 2024-04-08 PROCEDURE — 82784 ASSAY IGA/IGD/IGG/IGM EACH: CPT

## 2024-04-08 PROCEDURE — 84165 PROTEIN E-PHORESIS SERUM: CPT

## 2024-04-08 PROCEDURE — 83036 HEMOGLOBIN GLYCOSYLATED A1C: CPT

## 2024-04-08 PROCEDURE — 36415 COLL VENOUS BLD VENIPUNCTURE: CPT

## 2024-04-08 PROCEDURE — 86038 ANTINUCLEAR ANTIBODIES: CPT

## 2024-04-08 PROCEDURE — 84155 ASSAY OF PROTEIN SERUM: CPT

## 2024-04-08 PROCEDURE — 99214 OFFICE O/P EST MOD 30 MIN: CPT

## 2024-04-08 PROCEDURE — 3078F DIAST BP <80 MM HG: CPT

## 2024-04-08 PROCEDURE — 83516 IMMUNOASSAY NONANTIBODY: CPT

## 2024-04-08 PROCEDURE — 3074F SYST BP LT 130 MM HG: CPT

## 2024-04-08 PROCEDURE — 86160 COMPLEMENT ANTIGEN: CPT

## 2024-04-08 PROCEDURE — 87389 HIV-1 AG W/HIV-1&-2 AB AG IA: CPT

## 2024-04-08 ASSESSMENT — PATIENT HEALTH QUESTIONNAIRE - PHQ9: CLINICAL INTERPRETATION OF PHQ2 SCORE: 0

## 2024-04-08 ASSESSMENT — FIBROSIS 4 INDEX: FIB4 SCORE: 0.82

## 2024-04-08 NOTE — ASSESSMENT & PLAN NOTE
Chronic issue, new to me.   Recurrent rashes that occur to the tops of bilateral toes. Notes that toes will turn cold or have a blue hue. Rashes will last for about a week with increased sensitivity. + pain. But no numbness or tingling.

## 2024-04-08 NOTE — ASSESSMENT & PLAN NOTE
This is a chronic, stable medical condition.   Due for updated A1c testing after making lifestyle changes to reduce carbohydrate intake     A1c improved to 5.6%, now within normal range.     Does report some intermittent constipation. No blood or mucus in the stool, occasionally has stomach aches if eating too late in the day. No pain with having a bowel movement. Partner reports that sometimes he has cramping spasms when he does he needs to have a bowel movement but feels relieved after a bowel movement.     Aversion to food, offered referral for thrive previously but did not feel this was a good match for him.

## 2024-04-08 NOTE — PROGRESS NOTES
CC:   Chief Complaint   Patient presents with    Follow-Up     General follow up        HPI: Patient is a 38 y.o. male presenting to discuss the following:    Subjective & Assessment/Plan:    Problem List Items Addressed This Visit       Prediabetes (Chronic)     This is a chronic, stable medical condition.   Due for updated A1c testing after making lifestyle changes to reduce carbohydrate intake     A1c improved to 5.6%, now within normal range.     Does report some intermittent constipation. No blood or mucus in the stool, occasionally has stomach aches if eating too late in the day. No pain with having a bowel movement. Partner reports that sometimes he has cramping spasms when he does he needs to have a bowel movement but feels relieved after a bowel movement.     Aversion to food, offered referral for thrive previously but did not feel this was a good match for him.            Relevant Orders    POCT  A1C (Completed)    Rash     Chronic issue, new to me.   Recurrent rashes that occur to the tops of bilateral toes. Notes that toes will turn cold or have a blue hue. Rashes will last for about a week with increased sensitivity. + pain. But no numbness or tingling.              Relevant Orders    COMPLEMENT C4 (Completed)    CRYOGLOBULIN QL SERUM RFLX (Completed)    HIV AG/AB COMBO ASSAY SCREENING (Completed)    IMMUNOGLOBULINS A/G/M SERUM (Completed)    MPO AND PR3 WITH REFLEX TO ANCA (Completed)    PROTEIN ELECTROPHORESIS 24 HR URINE    SPEP W/REFLEX TO WES, A, G, M (Completed)    ALMAS REFLEXIVE PROFILE (Completed)     Other Visit Diagnoses       Colon cancer screening        Relevant Orders    COLOGUARD COLON CANCER SCREENING            Patient Active Problem List    Diagnosis Date Noted    Rash 04/08/2024    Lesion of left upper eyelid 02/08/2023    Prediabetes 02/08/2023    Dyslipidemia 02/08/2023    Encounter to establish care with new doctor 07/27/2022    Kidney stone 07/27/2022    Anxiety with flying  "07/27/2022    Acne vulgaris 07/27/2022       No current outpatient medications on file.     No current facility-administered medications for this visit.       Allergies as of 04/08/2024    (No Known Allergies)       Health Maintenance: Outstanding health maintenance items were ordered if applicable to patient including screening and preventative measures.     Review of Systems: Otherwise negative except for as stated above.      Objective:   BP 96/60   Pulse 94   Temp 37.1 °C (98.7 °F) (Temporal)   Resp 18   Ht 1.753 m (5' 9\")   Wt 76.2 kg (168 lb)   SpO2 96%  Body mass index is 24.81 kg/m².  Vital signs reviewed  Physical Exam  Physical Exam  Musculoskeletal:        Feet:    Feet:      Left foot:      Skin integrity: Blister present.      Comments: Enlarged erythematous lesion       Constitutional:       Appearance: Normal appearance.   Eyes:      Extraocular Movements: Extraocular movements intact.   Pulmonary:      Effort: Pulmonary effort is normal.   Neurological:      General: No focal deficit present.      Mental Status: She is alert and oriented to person, place, and time.   Psychiatric:         Mood and Affect: Mood normal.         Behavior: Behavior normal.       Follow-up: Return if symptoms worsen or fail to improve.    Please note that this dictation was created using voice recognition software. I have made every reasonable attempt to correct obvious errors, but I expect that there are errors of grammar and possibly content that I did not discover before finalizing the note.    Electronically signed by KEILA Alvarado on April 8, 2024  "

## 2024-04-10 LAB
IGA SERPL-MCNC: 256 MG/DL (ref 68–408)
IGG SERPL-MCNC: 963 MG/DL (ref 768–1632)
IGM SERPL-MCNC: 29 MG/DL (ref 35–263)
NUCLEAR IGG SER QL IA: NORMAL

## 2024-04-11 LAB
ALBUMIN SERPL ELPH-MCNC: 4.56 G/DL (ref 3.75–5.01)
ALPHA1 GLOB SERPL ELPH-MCNC: 0.24 G/DL (ref 0.19–0.46)
ALPHA2 GLOB SERPL ELPH-MCNC: 0.53 G/DL (ref 0.48–1.05)
B-GLOBULIN SERPL ELPH-MCNC: 0.79 G/DL (ref 0.48–1.1)
GAMMA GLOB SERPL ELPH-MCNC: 0.99 G/DL (ref 0.62–1.51)
INTERPRETATION SERPL IFE-IMP: NORMAL
MONOCLON BAND OBS SERPL: NORMAL
MONOCLONAL PROTEIN NL11656: NORMAL G/DL
MYELOPEROXIDASE AB SER-ACNC: 0 AU/ML (ref 0–19)
PATHOLOGY STUDY: NORMAL
PROT SERPL-MCNC: 7.1 G/DL (ref 6.3–8.2)
PROTEINASE3 AB SER-ACNC: 0 AU/ML (ref 0–19)

## 2024-04-12 ENCOUNTER — HOSPITAL ENCOUNTER (OUTPATIENT)
Facility: MEDICAL CENTER | Age: 39
End: 2024-04-12
Payer: COMMERCIAL

## 2024-04-12 DIAGNOSIS — R21 RASH: ICD-10-CM

## 2024-04-12 PROCEDURE — 84166 PROTEIN E-PHORESIS/URINE/CSF: CPT

## 2024-04-12 PROCEDURE — 86335 IMMUNFIX E-PHORSIS/URINE/CSF: CPT

## 2024-04-12 PROCEDURE — 84156 ASSAY OF PROTEIN URINE: CPT

## 2024-04-14 LAB — CRYOGLOB SER QL 3D COLD INC: NORMAL

## 2024-04-22 LAB
ALBUMIN 24H MFR UR ELPH: 100 %
ALPHA1 GLOB 24H MFR UR ELPH: 0 %
ALPHA2 GLOB 24H MFR UR ELPH: 0 %
B-GLOBULIN 24H MFR UR ELPH: 0 %
COLLECT DURATION TIME SPEC: 24 HRS
EER MONOCLONAL PROTEIN STUDY, 24 HOUR U Q5964: NORMAL
GAMMA GLOB 24H MFR UR ELPH: 0 %
INTERPRETATION UR IFE-IMP: NORMAL
M PROTEIN 24H MFR UR ELPH: 0 %
M PROTEIN 24H UR ELPH-MRATE: 0 MG/24 HRS
PROT 24H UR-MRATE: 120 MG/D (ref 40–150)
PROT UR-MCNC: 5 MG/DL
SPECIMEN VOL ?TM UR: 2400 ML

## 2024-06-13 ENCOUNTER — APPOINTMENT (OUTPATIENT)
Dept: MEDICAL GROUP | Facility: PHYSICIAN GROUP | Age: 39
End: 2024-06-13
Payer: COMMERCIAL

## 2024-08-11 ENCOUNTER — HOSPITAL ENCOUNTER (OUTPATIENT)
Dept: RADIOLOGY | Facility: MEDICAL CENTER | Age: 39
End: 2024-08-11
Attending: PHYSICIAN ASSISTANT
Payer: COMMERCIAL

## 2024-08-11 ENCOUNTER — OFFICE VISIT (OUTPATIENT)
Dept: URGENT CARE | Facility: PHYSICIAN GROUP | Age: 39
End: 2024-08-11
Payer: COMMERCIAL

## 2024-08-11 VITALS
HEART RATE: 61 BPM | TEMPERATURE: 97.1 F | BODY MASS INDEX: 25.65 KG/M2 | WEIGHT: 173.17 LBS | DIASTOLIC BLOOD PRESSURE: 70 MMHG | OXYGEN SATURATION: 97 % | SYSTOLIC BLOOD PRESSURE: 100 MMHG | HEIGHT: 69 IN | RESPIRATION RATE: 12 BRPM

## 2024-08-11 DIAGNOSIS — S39.012A STRAIN OF LUMBAR PARASPINOUS MUSCLE, INITIAL ENCOUNTER: ICD-10-CM
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
## 2024-08-11 PROCEDURE — 99213 OFFICE O/P EST LOW 20 MIN: CPT | Performed by: PHYSICIAN ASSISTANT

## 2024-08-11 PROCEDURE — 72100 X-RAY EXAM L-S SPINE 2/3 VWS: CPT

## 2024-08-11 RX ORDER — METHYLPREDNISOLONE 4 MG
4 TABLET, DOSE PACK ORAL DAILY
Qty: 21 TABLET | Refills: 0 | Status: SHIPPED | OUTPATIENT
Start: 2024-08-11

## 2024-08-11 ASSESSMENT — FIBROSIS 4 INDEX: FIB4 SCORE: 0.84

## 2024-08-11 NOTE — LETTER
"    EMPLOYEE’S CLAIM FOR COMPENSATION/ REPORT OF INITIAL TREATMENT  FORM C-4  PLEASE TYPE OR PRINT    EMPLOYEE’S CLAIM - PROVIDE ALL INFORMATION REQUESTED   First Name                    FILIBERTO Bobby                  Last Name  Emanuel Birthdate                    1985                Sex  Male Claim Number (Insurer’s Use Only)     Home Address  [address] Age  39 y.o. 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 (if applicable)  Lifting    How did this injury or occupational disease occur? (Be specific and answer in detail. Use additional sheet if necessary)  Lifting a crate of sodas off the ground onto another crate   If you believe that you have an occupational disease, when did you first have knowledge of the disability and its relationship to your employment?  N/A Witnesses to the Accident (if applicable)  N/A      Nature of Injury or Occupational Disease  Strain  Part(s) of Body Injured or Affected  Lower Back Area (Lumbar Area & Lumbo-Sacral)      I CERTIFY THAT THE ABOVE " IS TRUE AND CORRECT TO T HE BEST OF MY KNOWLEDGE AND THAT I HAVE PROVIDED THIS INFORMATION IN ORDER TO OBTAIN THE BENEFITS OF [place]CC[place], OR [place]  I HEREBY AUTHORIZE ANY PHYSICIAN, CHIROPRACTOR, SURGEON, PRACTITIONER OR ANY OTHER PERSON, ANY HOSPITAL, INCLUDING [facility] OR [facility], ANY  MEDICAL SERVICE ORGANIZATION, ANY INSURANCE COMPANY, OR OTHER INSTITUTION OR ORGANIZATION TO RELEASE TO EACH OTHER, ANY MEDICAL OR OTHER INFORMATION, INCLUDING BENEFITS PAID OR PAYABLE, PERTINENT TO THIS INJURY OR DISEASE, EXCEPT INFORMATION RELATIVE TO DIAGNOSIS, TREATMENT AND/OR COUNSELING FOR AIDS, PSYCHOLOGICAL CONDITIONS, ALCOHOL OR CONTROLLED SUBSTANCES, FOR WHICH I MUST GIVE SPECIFIC AUTHORIZATION.  A PHOTOSTAT OF THIS AUTHORIZATION SHALL BE VALID AS THE ORIGINAL.     Date   Place Employee’s Original or  *Electronic Signature   THIS REPORT MUST BE COMPLETED AND MAILED WITHIN 3 WORKING DAYS OF TREATMENT   Place  [facility]    Name of Facility  [place]   Date 8/11/2024 Diagnosis and Description of Injury or Occupational Disease  (S39.012A) Strain of lumbar paraspinous muscle, initial encounter  (Y99.0) Work related injury  Diagnoses of Strain of lumbar paraspinous muscle, initial encounter and Work related injury were pertinent to this visit. Is there evidence that the injured employee was under the influence of alcohol and/or another controlled substance at the time of accident?  []No  [] Yes (if yes, please explain)   Hour 1:06 PM  No   Treatment: X-ray obtained, normal.  Trial Medrol Dosepak and Zanaflex for symptom support, use over-the-counter medications for additional relief.  Work status updated.  Return on 8/16/2024      Have you advised the patient to remain off work five days or more?   [] Yes Indicate dates: From   To    [] No      If no, is the injured employee capable of: []  full duty [] modified duty                     If modified duty, specify any limitations / restrictions:                                                                                                                                                                                                                                                                                                                                                                                                                  X-Ray Findings: Negative    From information given by the employee, together with medical evidence, can you directly connect this injury or occupational disease as job incurred?  []Yes   [] No Yes    Is additional medical care by a physician indicated? []Yes [] No  Yes    Do you know of any previous injury or disease contributing to this condition or occupational disease? []Yes [] No (Explain if yes)                          No   Date  8/11/2024 Print Health Care Provider’s Name  Russel Ayala P.A.-C. I certify that the employer’s copy of  this form was delivered to the employer on:   Address  [address] INSURER'S USE ONLY                       [facility]  [address] Provider’s Tax ID Number  [id number]   Telephone  Dept: [phone]    Health Care Provider’s Original or Electronic Signature  e-RUSSEL [place] P.A.-C. Degree (MD,DO, DC,PAFriedaC,APRN)  PAJOSH  Choose (if applicable)      ORIGINAL - TREATING HEALTHCARE PROVIDER PAGE 2 - INSURER/TPA PAGE 3 - EMPLOYER PAGE 4 - EMPLOYEE             Form C-4 (rev.08/23)

## 2024-08-11 NOTE — PROGRESS NOTES
"Subjective:     Kanu Castellanos is a 39 y.o. male who presents for Back Injury (PT was lifting something heavy and bent over to grab it x2 weeks. First felt soreness in his lower back, but it progressively gotten worse. )         DOI: 7/21/2024     AUTUMN: Injured back while lifting a crate of sodas off of the ground    Initial visit:  Presents today for the above-stated injury on the above-stated date.  Since the date of injury has been experiencing intermittent episodes of low back pain, worsened with various trunk movements.  Has been using ibuprofen for symptoms.  No lower extremity radiculopathy.  No numbness/tingling into the groin, no loss of bowel bladder function.  No secondary occupation, no predisposing injuries.    PMH:   No pertinent past medical history to this problem  MEDS:  Medications were reviewed in EMR  ALLERGIES:  Allergies were reviewed in EMR  SOCHX:  Works as a   FH:   No pertinent family history to this problem       Objective:     /70 (BP Location: Left arm, Patient Position: Sitting, BP Cuff Size: Small adult)   Pulse 61   Temp 36.2 °C (97.1 °F) (Temporal)   Resp 12   Ht 1.753 m (5' 9\") Comment: Pt reported  Wt 78.5 kg (173 lb 2.7 oz)   SpO2 97%   BMI 25.57 kg/m²     Examination of the lumbar spine does reveal tenderness palpation over bilateral lumbar paraspinal musculature.  No bony midline tenderness.  Lower extremity myotome dermatomes testing normal.      Diagnostic:  Lumbar spine x-ray series  Radiologist IMPRESSION:     Normal complete lumbar spine series.    Assessment/Plan:     Encounter Diagnoses   Name Primary?    Strain of lumbar paraspinous muscle, initial encounter     Work related injury          Plan:  X-ray obtained, normal.  Trial Medrol Dosepak and Zanaflex for symptom support, use over-the-counter medications for additional relief.  Work status updated.  Return on 8/16/2024      Differential diagnosis, natural history, supportive care, and " indications for immediate follow-up discussed.    Sahil Ayala PA-C

## 2024-08-11 NOTE — LETTER
PHYSICIAN’S AND CHIROPRACTIC PHYSICIAN'S   PROGRESS REPORT CERTIFICATION OF DISABILITY Claim Number:     Social Security Number:    Patient’s Name:Kanu Castellanos Date of Injury:7/21/2024   Employer: [employer] Name of O (if applicable)      Patient’s Job Description/Occupation: Barista       Previous Injuries/Diseases/Surgeries Contributing to the Condition:         Diagnosis:  (S39.012A) Strain of lumbar paraspinous muscle, initial encounter  (Y99.0) Work related injury      Related to the Industrial Injury? Yes     Explain:DOI: 7/21/2024     AUTUMN: Injured back while lifting a crate of sodas off of the ground    Initial visit:  Presents today for the above-stated injury on the above-stated date.  Since the date of injury has been experiencing intermittent episodes of low back pain, worsened with various trunk movements.  Has been using ibuprofen for symptoms.  No lower extremity radiculopathy.  No numbness/tingling into the groin, no loss of bowel bladder function.  No secondary occupation, no predisposing injuries.      Objective Medical Findings:Examination of the lumbar spine does reveal tenderness palpation over bilateral lumbar paraspinal musculature.  No bony midline tenderness.  Lower extremity myotome dermatomes testing normal.        None - Discharged                         Stable  No                 Ratable  No       Generally Improved                        Condition Worsened                 Condition Same  May Have Suffered a Permanent Disability  No     Treatment Plan:    X-ray obtained, normal.  Trial Medrol Dosepak and Zanaflex for symptom support, use over-the-counter medications for additional relief.  Work status updated.  Return on 8/16/2024        No Change in Therapy                 PT/OT Prescribed                     Medication May be Used While Working       Case Management                        PT/OT Discontinued    Consultation    Further Diagnostic Studies:                                Prescription(s)          Medrol Dosepak, Zanaflex                X  Released to FULL DUTY /No Restrictions on (Date):  From:      Certified TOTALLY TEMPORARILY DISABLED (Indicate Dates) From:   To:      Released to RESTRICTED/Modified Duty on (Date): From:   To:                                                                 Restrictions Are:  Temporary     No Sitting                               No Standing                   No Pulling                  Other: Trial full work duty at this time    No Bending at Waist           No Stooping                    No Lifting       No Carrying                           No Walking                Lifting Restricted to (lbs.):       No Pushing                            No Climbing                   No Reaching Above Shoulders   Date of Next Visit:  8/16/2024 11:00 AM Date of this Exam:8/11/2024 Physician/Chiropractic Physician Name:Sahil Ayala P.A.-C. Physician/Chiropractic Physician Signature:  Franki Zapata DO MPH   D-39 (Rev. 2/24)

## 2024-08-16 ENCOUNTER — OCCUPATIONAL MEDICINE (OUTPATIENT)
Dept: URGENT CARE | Facility: PHYSICIAN GROUP | Age: 39
End: 2024-08-16
Payer: COMMERCIAL

## 2024-08-16 VITALS
OXYGEN SATURATION: 96 % | SYSTOLIC BLOOD PRESSURE: 114 MMHG | HEIGHT: 69 IN | HEART RATE: 71 BPM | BODY MASS INDEX: 25.62 KG/M2 | TEMPERATURE: 97.6 F | DIASTOLIC BLOOD PRESSURE: 72 MMHG | RESPIRATION RATE: 12 BRPM | WEIGHT: 173 LBS

## 2024-08-16 DIAGNOSIS — S39.012D STRAIN OF LUMBAR PARASPINAL MUSCLE, SUBSEQUENT ENCOUNTER: ICD-10-CM

## 2024-08-16 PROCEDURE — 3074F SYST BP LT 130 MM HG: CPT | Performed by: NURSE PRACTITIONER

## 2024-08-16 PROCEDURE — 3078F DIAST BP <80 MM HG: CPT | Performed by: NURSE PRACTITIONER

## 2024-08-16 PROCEDURE — 99213 OFFICE O/P EST LOW 20 MIN: CPT | Performed by: NURSE PRACTITIONER

## 2024-08-16 ASSESSMENT — ENCOUNTER SYMPTOMS
ORTHOPNEA: 0
CHILLS: 0
SHORTNESS OF BREATH: 0
MYALGIAS: 1
HEADACHES: 0
DIZZINESS: 0
FALLS: 0
SENSORY CHANGE: 0
FLANK PAIN: 0
TINGLING: 0
VOMITING: 0
FOCAL WEAKNESS: 0
CONSTIPATION: 0
FEVER: 0
NAUSEA: 0
DIARRHEA: 0
PALPITATIONS: 0
WEAKNESS: 0
BACK PAIN: 1
ABDOMINAL PAIN: 0

## 2024-08-16 ASSESSMENT — FIBROSIS 4 INDEX: FIB4 SCORE: 0.84

## 2024-08-16 NOTE — LETTER
PHYSICIAN’S AND CHIROPRACTIC PHYSICIAN'S   PROGRESS REPORT CERTIFICATION OF DISABILITY Claim Number:     Social Security Number:    Patient’s Name:Kanu Castellanos Date of Injury:7/21/2024   Employer: [employer] Name of O (if applicable)      Patient’s Job Description/Occupation: Barista       Previous Injuries/Diseases/Surgeries Contributing to the Condition:  N/A      Diagnosis:  (S39.012D) Strain of lumbar paraspinal muscle, subsequent encounter      Related to the Industrial Injury? Yes     Explain:DOI: 7/21/2024     AUTUMN: Injured back while lifting a crate of sodas off of the ground. Today: 2nd encounter.  States paraspinous muscle discomfort has improved.  States back discomfort has moved to his mid back.  States he is working full-time without restriction.  States having no problems performing work duties.  States he continues to take his prednisone as prescribed as well as his muscle relaxant when at home.  No heat application or topical pain reliever used.  Will perform back stretches twice daily.  Does go to the gym and walks but no noted heavy lifting.      Objective Medical Findings:No paraspinal muscle tenderness on palpation of the lumbar or thoracic region.  Performs full range of motion with extension, flexion, twisting motions and reaching above his head without discomfort or difficulty.  No antalgic gait.        None - Discharged                         Stable  Yes                 Ratable  No     X  Generally Improved                        Condition Worsened                 Condition Same  May Have Suffered a Permanent Disability  No     Treatment Plan:    -May continue to use prednisone and muscle relaxant as prescribed  -Once done with prednisone, may use over-the-counter ibuprofen up to 600 mg every 6 hours as needed for pain  -Heat application to paraspinous muscles up to 4 times/day, apply topical pain reliever, massage  -Perform gentle range of motion exercises for back to prevent  muscle stiffness to toleration  -Will initiate physical therapy at this time  -Next recheck will be through occupational medicine in 2 weeks      X  No Change in Therapy               X  PT/OT Prescribed                   X  Medication May be Used While Working       Case Management                        PT/OT Discontinued    Consultation    Further Diagnostic Studies:                               Prescription(s)          -No new prescriptions at this time                X  Released to FULL DUTY /No Restrictions on (Date):  From: 8/16/2024    Certified TOTALLY TEMPORARILY DISABLED (Indicate Dates) From:   To:      Released to RESTRICTED/Modified Duty on (Date): From:   To:                                                                 Restrictions Are:  Temporary   X  No Sitting                             X  No Standing                 X  No Pulling                  Other: No work restrictions, full duty.  X  No Bending at Waist         X  No Stooping                  X  No Lifting     X  No Carrying                         X  No Walking                Lifting Restricted to (lbs.):     X  No Pushing                          X  No Climbing                 X  No Reaching Above Shoulders   Date of Next Visit:  8/30/2024 Date of this Exam:8/16/2024 Physician/Chiropractic Physician Name:DERICK Weeks Physician/Chiropractic Physician Signature:  Franki Zapata DO MPH   D-39 (Rev. 2/24)

## 2024-08-16 NOTE — PROGRESS NOTES
"Subjective     Kanu Castellanos is a 39 y.o. male who presents with Follow-Up ([facility] / DOI 7.21.24 / lower back pain , still has discomfort >  gets tight )            HPI  DOI: 7/21/2024     AUTUMN: Injured back while lifting a crate of sodas off of the ground. Today: 2nd encounter.  States paraspinous muscle discomfort has improved.  States back discomfort has moved to his mid back.  States he is working full-time without restriction.  States having no problems performing work duties.  States he continues to take his prednisone as prescribed as well as his muscle relaxant when at home.  No heat application or topical pain reliever used.  Will perform back stretches twice daily.  Does go to the gym and walks but no noted heavy lifting.    PMH: No pertinent past medical history to this problem  MEDS: Medications were reviewed in Epic  ALLERGIES: Allergies were reviewed in Epic  FH: No pertinent family history to this problem       Review of Systems   Constitutional:  Negative for chills, fever and malaise/fatigue.   Respiratory:  Negative for shortness of breath.    Cardiovascular:  Negative for chest pain, palpitations and orthopnea.   Gastrointestinal:  Negative for abdominal pain, constipation, diarrhea, nausea and vomiting.   Genitourinary:  Negative for dysuria, flank pain, frequency, hematuria and urgency.   Musculoskeletal:  Positive for back pain and myalgias. Negative for falls and joint pain.   Skin:  Negative for itching and rash.   Neurological:  Negative for dizziness, tingling, sensory change, focal weakness, weakness and headaches.   All other systems reviewed and are negative.             Objective     /72   Pulse 71   Temp 36.4 °C (97.6 °F) (Temporal)   Resp 12   Ht 1.753 m (5' 9\")   Wt 78.5 kg (173 lb)   SpO2 96%   BMI 25.55 kg/m²      Physical Exam  Vitals reviewed.   Constitutional:       General: He is awake and active. He is not in acute distress.     Appearance: Normal appearance. He " is well-developed.   Eyes:      Extraocular Movements: EOM normal.   Cardiovascular:      Rate and Rhythm: Normal rate.   Pulmonary:      Effort: Pulmonary effort is normal.   Musculoskeletal:      Thoracic back: Normal.      Lumbar back: Normal.   Skin:     General: Skin is warm and dry.   Neurological:      Mental Status: He is alert and oriented to person, place, and time.      Sensory: Sensation is intact.      Motor: Motor function is intact.      Coordination: Coordination is intact.      Gait: Gait is intact.   Psychiatric:         Attention and Perception: Attention normal.         Mood and Affect: Mood normal.         Speech: Speech normal.         Behavior: Behavior normal. Behavior is cooperative.         No paraspinal muscle tenderness on palpation of the lumbar or thoracic region.  Performs full range of motion with extension, flexion, twisting motions and reaching above his head without discomfort or difficulty.  No antalgic gait.                   Assessment & Plan        Assessment & Plan  Strain of lumbar paraspinal muscle, subsequent encounter    Orders:    Referral to Physical Therapy    Referral to Occupational Medicine     -May continue to use prednisone and muscle relaxant as prescribed  -Once done with prednisone, may use over-the-counter ibuprofen up to 600 mg every 6 hours as needed for pain  -Heat application to paraspinous muscles up to 4 times/day, apply topical pain reliever, massage  -Perform gentle range of motion exercises for back to prevent muscle stiffness to toleration  -Will initiate physical therapy at this time  -Next recheck will be through occupational medicine in 2 weeks

## 2024-08-30 ENCOUNTER — OCCUPATIONAL MEDICINE (OUTPATIENT)
Dept: OCCUPATIONAL MEDICINE | Facility: CLINIC | Age: 39
End: 2024-08-30
Payer: COMMERCIAL

## 2024-08-30 VITALS
TEMPERATURE: 97.9 F | BODY MASS INDEX: 25.18 KG/M2 | RESPIRATION RATE: 16 BRPM | WEIGHT: 170 LBS | SYSTOLIC BLOOD PRESSURE: 130 MMHG | DIASTOLIC BLOOD PRESSURE: 86 MMHG | HEIGHT: 69 IN | OXYGEN SATURATION: 99 % | HEART RATE: 89 BPM

## 2024-08-30 DIAGNOSIS — S39.012D STRAIN OF LUMBAR PARASPINOUS MUSCLE, SUBSEQUENT ENCOUNTER: ICD-10-CM

## 2024-08-30 ASSESSMENT — PAIN SCALES - GENERAL: PAINLEVEL: NO PAIN

## 2024-08-30 ASSESSMENT — FIBROSIS 4 INDEX: FIB4 SCORE: 0.84

## 2024-08-30 NOTE — PROGRESS NOTES
"Subjective:     Kanu Castellanos is a 39 y.o. male who presents for Follow-Up (DOI: 7/21/2024 - lower back - )    DOI: 7/21/2024 AUTUMN: Injured back while lifting a crate of sodas off of the ground.     Today patient states symptoms are improved gradually.  He states that he has noticed some mild soreness and fatigue and is unsure if this is just age-related or it is can take some time for him to improve overall.  He denies leg weakness, bowel or bladder changes, or saddle anesthesia.  He states that he took the oral steroids and the muscle relaxer but did not feel that it was really helpful.  He does a lot of exercises and goes to the gym and walks every day of the week.  Physical therapy was currently pending he does not feel like he needs it at this time.  He will be released from care at this time.  Plan of care discussed with patient.       ROS: All systems were reviewed on intake form, form was reviewed and signed. See scanned documents in media. Pertinent positives and negatives included in HPI.    PMH: No pertinent past medical history to this problem  MEDS: Medications were reviewed in Epic  ALLERGIES: No Known Allergies  SOCHX: Works as barista at [employer]  FH: No pertinent family history to this problem       Objective:     Ht 1.753 m (5' 9\")   BMI 25.55 kg/m²     [unfilled]    Back: No gross deformity or discoloration noted.  No paraspinal muscle tenderness on palpation of the lumbar or thoracic region.  Full range of motion with extension, flexion, twisting motions and reaching above his head without discomfort or difficulty.  No antalgic gait.    Assessment/Plan:       1. Strain of lumbar paraspinous muscle, subsequent encounter      FROM  8/30/24  TO            Differential diagnosis, natural history, supportive care, and indications for immediate follow-up discussed.    Approximately 30 minutes were spent in reviewing notes, preparing for visit, obtaining history, exam and evaluation, patient " counseling/education and post visit documentation/orders.

## 2024-08-30 NOTE — LETTER
PHYSICIAN’S AND CHIROPRACTIC PHYSICIAN'S   PROGRESS REPORT CERTIFICATION OF DISABILITY Claim Number:     Social Security Number:    Patient’s Name: Kanu Castellanos Date of Injury: 7/21/2024   Employer: [employer] Name of [employer] (if applicable):      Patient’s Job Description/Occupation:       Previous Injuries/Diseases/Surgeries Contributing to the Condition:         Diagnosis: (S39.012D) Strain of lumbar paraspinous muscle, subsequent encounter      Related to the Industrial Injury? Yes     Explain: DOI: 7/21/2024     AUTUMN: Injured back while lifting a crate of sodas off of the ground.       Objective Medical Findings: Back: No gross deformity or discoloration noted.  No paraspinal muscle tenderness on palpation of the lumbar or thoracic region.  Full range of motion with extension, flexion, twisting motions and reaching above his head without discomfort or difficulty.  No antalgic gait.         None - Discharged                         Stable  No                 Ratable  No     X   Generally Improved                         Condition Worsened                  Condition Same  May Have Suffered a Permanent Disability No     Treatment Plan:    Discharged/MMI  Released to full duty  Follow-up if needed         No Change in Therapy                  PT/OT Prescribed                      Medication May be Used While Working        Case Management                          PT/OT Discontinued    Consultation    Further Diagnostic Studies:    Prescription(s)               X  Released to FULL DUTY /No Restrictions on (Date):  From:      Certified TOTALLY TEMPORARILY DISABLED (Indicate Dates) From:   To:      Released to RESTRICTED/Modified Duty on (Date): From:   To:    Restrictions Are:         No Sitting    No Standing    No Pulling Other:         No Bending at Waist     No Stooping     No Lifting        No Carrying     No Walking Lifting Restricted to (lbs.):          No Pushing        No Climbing     No Reaching Above  Shoulders       Date of Next Visit:   DISCHARGED Date of this Exam: 8/30/2024 Physician/Chiropractic Physician Name: DERICK Shepard Physician/Chiropractic Physician Signature:  Franki Zapata DO MPH                                                                                                                                                                                                            D-39 (Rev. 2/24)

## 2024-08-31 ENCOUNTER — PATIENT MESSAGE (OUTPATIENT)
Dept: MEDICAL GROUP | Facility: PHYSICIAN GROUP | Age: 39
End: 2024-08-31
Payer: COMMERCIAL

## 2024-08-31 DIAGNOSIS — R73.03 PREDIABETES: Chronic | ICD-10-CM

## 2024-10-04 ENCOUNTER — HOSPITAL ENCOUNTER (OUTPATIENT)
Dept: LAB | Facility: MEDICAL CENTER | Age: 39
End: 2024-10-04
Payer: COMMERCIAL

## 2024-10-04 DIAGNOSIS — R73.03 PREDIABETES: Chronic | ICD-10-CM

## 2024-10-04 PROCEDURE — 83525 ASSAY OF INSULIN: CPT

## 2024-10-06 ENCOUNTER — PATIENT MESSAGE (OUTPATIENT)
Dept: MEDICAL GROUP | Facility: PHYSICIAN GROUP | Age: 39
End: 2024-10-06
Payer: COMMERCIAL

## 2024-10-06 DIAGNOSIS — R73.03 PREDIABETES: Chronic | ICD-10-CM

## 2024-10-06 LAB — INSULIN P FAST SERPL-ACNC: 7 UIU/ML (ref 3–25)

## 2024-10-09 ENCOUNTER — HOSPITAL ENCOUNTER (OUTPATIENT)
Dept: LAB | Facility: MEDICAL CENTER | Age: 39
End: 2024-10-09
Payer: COMMERCIAL

## 2024-10-09 DIAGNOSIS — R73.03 PREDIABETES: Chronic | ICD-10-CM

## 2024-10-09 LAB
EST. AVERAGE GLUCOSE BLD GHB EST-MCNC: 120 MG/DL
HBA1C MFR BLD: 5.8 % (ref 4–5.6)

## 2024-10-09 PROCEDURE — 36415 COLL VENOUS BLD VENIPUNCTURE: CPT

## 2024-10-09 PROCEDURE — 83036 HEMOGLOBIN GLYCOSYLATED A1C: CPT

## 2025-02-03 ENCOUNTER — PATIENT MESSAGE (OUTPATIENT)
Dept: MEDICAL GROUP | Facility: PHYSICIAN GROUP | Age: 40
End: 2025-02-03
Payer: COMMERCIAL

## 2025-02-03 DIAGNOSIS — R73.03 PREDIABETES: ICD-10-CM

## 2025-02-11 ENCOUNTER — HOSPITAL ENCOUNTER (OUTPATIENT)
Dept: LAB | Facility: MEDICAL CENTER | Age: 40
End: 2025-02-11
Payer: COMMERCIAL

## 2025-02-11 DIAGNOSIS — R73.03 PREDIABETES: ICD-10-CM

## 2025-02-11 LAB
EST. AVERAGE GLUCOSE BLD GHB EST-MCNC: 111 MG/DL
HBA1C MFR BLD: 5.5 % (ref 4–5.6)

## 2025-02-11 PROCEDURE — 36415 COLL VENOUS BLD VENIPUNCTURE: CPT

## 2025-02-11 PROCEDURE — 83036 HEMOGLOBIN GLYCOSYLATED A1C: CPT

## 2025-02-18 ENCOUNTER — RESULTS FOLLOW-UP (OUTPATIENT)
Dept: MEDICAL GROUP | Facility: PHYSICIAN GROUP | Age: 40
End: 2025-02-18

## 2025-04-16 ENCOUNTER — APPOINTMENT (OUTPATIENT)
Dept: MEDICAL GROUP | Facility: PHYSICIAN GROUP | Age: 40
End: 2025-04-16
Payer: COMMERCIAL

## 2025-05-13 ENCOUNTER — OFFICE VISIT (OUTPATIENT)
Dept: URGENT CARE | Facility: PHYSICIAN GROUP | Age: 40
End: 2025-05-13
Payer: COMMERCIAL

## 2025-05-13 VITALS
SYSTOLIC BLOOD PRESSURE: 118 MMHG | WEIGHT: 179.45 LBS | TEMPERATURE: 97.2 F | RESPIRATION RATE: 12 BRPM | HEIGHT: 69 IN | HEART RATE: 80 BPM | DIASTOLIC BLOOD PRESSURE: 68 MMHG | OXYGEN SATURATION: 97 % | BODY MASS INDEX: 26.58 KG/M2

## 2025-05-13 DIAGNOSIS — L03.213 PRESEPTAL CELLULITIS OF RIGHT EYE: Primary | ICD-10-CM

## 2025-05-13 DIAGNOSIS — H00.011 HORDEOLUM EXTERNUM OF RIGHT UPPER EYELID: ICD-10-CM

## 2025-05-13 PROCEDURE — 99214 OFFICE O/P EST MOD 30 MIN: CPT | Performed by: NURSE PRACTITIONER

## 2025-05-13 PROCEDURE — 3078F DIAST BP <80 MM HG: CPT | Performed by: NURSE PRACTITIONER

## 2025-05-13 PROCEDURE — 3074F SYST BP LT 130 MM HG: CPT | Performed by: NURSE PRACTITIONER

## 2025-05-13 RX ORDER — ERYTHROMYCIN 5 MG/G
1 OINTMENT OPHTHALMIC 4 TIMES DAILY
Qty: 3.5 G | Refills: 0 | Status: SHIPPED | OUTPATIENT
Start: 2025-05-13 | End: 2025-05-23

## 2025-05-13 ASSESSMENT — FIBROSIS 4 INDEX: FIB4 SCORE: 0.87

## 2025-05-15 NOTE — PROGRESS NOTES
Verbal consent was acquired by the patient to use [employer] ambient listening note generation during this visit          Chief Complaint   Patient presents with    Blepharitis     X 3 days Patient right eyelid is swollen has no pain but says it stings.          History of Present Illness  The patient is a 40-year-old male who presents for evaluation of a stye. He is accompanied by his niece.    He reports the onset of a stye on Friday, which has since escalated in severity. He describes the sensation as stinging and notes that his eyes appear fatigued. He expresses concern about potential drainage into his eye and queries the necessity of oral antibiotics. His niece observes that the stye appears to be draining onto his right cheek, causing additional swelling. This is his third occurrence of a stye within a year, with the previous two instances being less severe. He recalls that the initial stye was more pronounced before it began to resolve. He also mentions a propensity for cystic acne around his jawline but does not believe this to be an atypical eye infection. He has not previously used erythromycin ointment for his stye. He reports that his eye was swollen shut for two days, but he managed to open it slightly after consuming food and Excedrin. He is able to predict the onset of a stye upon waking. He initiated treatment with warm compresses, but due to recent travel, he has been unable to maintain this regimen. He has attempted to alleviate the discomfort with a cold Coke can.    He has a history of cystic acne around his jawline. He has previously consulted a dermatologist for his cystic acne, but they were unable to provide treatment until they observed an active lesion. He has tried several topical treatments without success, noting that they only served to dry out his skin. He is aware that certain foods, such as chocolate and coffee, can trigger his cystic acne.         ROS:    No severe shortness of  breath   No cardiac like chest pain, as discussed   As otherwise stated in HPI    Medical/SX/ Social History:  Reviewed per chart    Pertinent Medications:    Medications Ordered Prior to Encounter[1]     Allergies:    Patient has no known allergies.     Problem list, medications, and allergies reviewed by myself today in Epic     Physical Exam:    Vitals:    05/13/25 1929   BP: 118/68   Pulse: 80   Resp: 12   Temp: 36.2 °C (97.2 °F)   SpO2: 97%             Physical Exam  Vitals and nursing note reviewed.   Constitutional:       General: He is not in acute distress.     Appearance: Normal appearance. He is not ill-appearing or toxic-appearing.   HENT:      Head: Normocephalic and atraumatic.      Nose: Nose normal.      Mouth/Throat:      Mouth: Mucous membranes are moist.      Pharynx: Oropharynx is clear.   Eyes:      General:         Right eye: Discharge and hordeolum present. No foreign body.      Extraocular Movements: Extraocular movements intact.      Conjunctiva/sclera:      Right eye: Right conjunctiva is injected.      Pupils: Pupils are equal, round, and reactive to light.        Comments: Right upper eyelid is markedly swollen with a prominent stye.  The stye has a pustular head noted on it.  The eyelid is swollen almost shut.  It is erythematous, fluctuant and painful to palpation.  No active drainage. There is erythema and edema to the face beneath the right lower eye with tenderness to palpation. No fluctuance or induration felt.    Cardiovascular:      Rate and Rhythm: Normal rate.      Pulses: Normal pulses.   Pulmonary:      Effort: Pulmonary effort is normal.   Musculoskeletal:         General: Normal range of motion.      Cervical back: Normal range of motion.   Skin:     General: Skin is warm.      Capillary Refill: Capillary refill takes less than 2 seconds.   Neurological:      General: No focal deficit present.      Mental Status: He is alert and oriented to person, place, and time.             Medical Decision making and plan :  I personally reviewed prior external notes and test results pertinent to today's visit. Pt is clinically stable at today's acute urgent care visit.  Patient appears nontoxic with no acute distress noted. Appropriate for outpatient care at this time.    Pleasant 40 y.o. male presented clinic with:     Assessment & Plan  1. Stye, right upper eyelid, acute.   - The stye started a few days ago and has become quite severe, causing significant swelling and pain. It is currently in the middle stages of development.  - The patient has been using warm compresses intermittently due to travel. He was advised to continue using warm compresses and to avoid popping the stye to allow it to drain naturally.  - Erythromycin ointment was prescribed to be applied four times daily.     2. Preseptal cellulitis, right, acute.  - Augmentin was prescribed to be taken twice daily with breakfast and dinner.  - Medication sent to pharmacy.   - Monitor symptoms carefully. He should present to the ER for any worsening of erythema, swelling, pain, or development of fever.  ER for any visual changes as well.           Shared decision-making was utilized with patient for treatment plan. Medication discussed included indication for use and the potential benefits and side effects. Education was provided regarding the aforementioned assessments.  Differential Diagnosis, natural history, and supportive care discussed. All of the patient's questions were answered to their satisfaction at the time of discharge. Patient was encouraged to monitor symptoms closely. Those signs and symptoms which would warrant concern and mandate seeking a higher level of service through the emergency department discussed at length.  Patient stated agreement and understanding of this plan of care.    Disposition:  Home in stable condition     Voice Recognition Disclaimer:  Portions of this document were created using voice  recognition software and CONNIE AI technology provided by [facility]. The software does have a chance of producing errors of grammar and possibly content. I have made every reasonable attempt to correct obvious errors, but there may be errors of grammar and possibly content that I did not discover before finalizing the  documentation.    DERICK Lerma          [1]   Current Outpatient Medications on File Prior to Visit   Medication Sig Dispense Refill    methylPREDNISolone (MEDROL DOSEPAK) 4 MG Tablet Therapy Pack Take 1 Tablet by mouth every day. Follow schedule on package instructions. (Patient not taking: Reported on 5/13/2025) 21 Tablet 0    tizanidine (ZANAFLEX) 4 MG Tab Take 1 Tablet by mouth every 6 hours as needed (Muscle Spasm). (Patient not taking: Reported on 5/13/2025) 15 Tablet 0     No current facility-administered medications on file prior to visit.

## 2025-05-27 ENCOUNTER — TELEPHONE (OUTPATIENT)
Dept: MEDICAL GROUP | Facility: PHYSICIAN GROUP | Age: 40
End: 2025-05-27
Payer: COMMERCIAL

## 2025-05-27 NOTE — TELEPHONE ENCOUNTER
Patient was rescheduled today (05/27/25) due to Issac being OOO. Patient expressed his frustration as he states this is the 3rd time he has been rescheduled. I returned his call and left a voicemail to return my call. Patient is scheduled to see Bell Coats on 05/29

## 2025-05-29 ENCOUNTER — OFFICE VISIT (OUTPATIENT)
Dept: MEDICAL GROUP | Facility: PHYSICIAN GROUP | Age: 40
End: 2025-05-29
Payer: COMMERCIAL

## 2025-05-29 VITALS
HEIGHT: 69 IN | BODY MASS INDEX: 26.36 KG/M2 | RESPIRATION RATE: 16 BRPM | WEIGHT: 178 LBS | TEMPERATURE: 97.7 F | OXYGEN SATURATION: 98 % | SYSTOLIC BLOOD PRESSURE: 104 MMHG | DIASTOLIC BLOOD PRESSURE: 70 MMHG | HEART RATE: 92 BPM

## 2025-05-29 DIAGNOSIS — J06.9 VIRAL URI: Primary | ICD-10-CM

## 2025-05-29 DIAGNOSIS — L70.0 ACNE VULGARIS: ICD-10-CM

## 2025-05-29 DIAGNOSIS — R73.03 PREDIABETES: Chronic | ICD-10-CM

## 2025-05-29 RX ORDER — DOXYCYCLINE 100 MG/1
100 CAPSULE ORAL 2 TIMES DAILY
Qty: 10 CAPSULE | Refills: 0 | Status: SHIPPED | OUTPATIENT
Start: 2025-05-29 | End: 2025-06-03

## 2025-05-29 RX ORDER — CLINDAMYCIN PHOSPHATE 10 MG/G
1 GEL TOPICAL 2 TIMES DAILY
Qty: 30 G | Refills: 0 | Status: SHIPPED | OUTPATIENT
Start: 2025-05-29

## 2025-05-29 ASSESSMENT — FIBROSIS 4 INDEX: FIB4 SCORE: 0.87

## 2025-05-29 NOTE — PROGRESS NOTES
"Subjective:     Chief Complaint   Patient presents with    Otalgia    Other     Possible cold      History of Present Illness  The patient is a 40-year-old male here following up for recent illness as an established patient of JAMESON Alvarado.    He has been experiencing sinus symptoms, including rhinorrhea, nasal congestion, sneezing, and phlegm in the throat, for approximately 6 to 7 days. He also reports a sensation of pressure in his left ear, which he describes as feeling \"squishy,\" but no similar sensation in the right ear. He has not experienced any fevers or chills. He occasionally coughs but does not have a persistent cough that disrupts his sleep or causes throat irritation. His voice remains unaffected. He reports feeling congested and has been dealing with a healing stye.     He had a cold at the beginning of the month, which lasted for about 6 to 7 days, and then developed a stye. He has a history of cystic acne and believes the stye was related to this condition. The stye was swollen shut for a while and took some time to heal. He was prescribed antibiotics by JAMESON Frey, which he completed. He has had two other styes in the past year and a half, which resolved on their own. He has been using Mucinex and DayQuil, which he finds helpful. He has not used Flonase nasal spray due to sensitivity in his nose. He reports no sore throat but mentions that his throat becomes irritated when he attempts to clear phlegm. He still has his tonsils.    He reports no worsening of his symptoms but notes increased rhinorrhea today. His nasal discharge is clear to light yellow in color. He occasionally takes allergy medications such as Zyrtec, Claritin, Allegra, or Flonase nasal spray.    He has been managing prediabetes. His A1c level was 5.6, but it increased to 5.7, indicating prediabetes. He is considering medication for blood sugar control.    He is currently not using any treatments for his cystic acne. " "He believes that chocolate or coffee triggers his acne. He does not use any topical treatments or washes. He has never tried Accutane. He has previously seen a dermatologist and was prescribed two different treatments, which were ineffective.    He is considering getting a colonoscopy earlier than recommended due to his father's history of polyps and his own unhealthy diet during his youth. He had his gallbladder removed at a young age.    PAST SURGICAL HISTORY:  Gallbladder removal at a young age.    SOCIAL HISTORY  He works at [employer].    FAMILY HISTORY  His father had polyps.    Allergies: Patient has no known allergies.  ROS per HPI  Health Maintenance: Deferred     Objective:     /70 (BP Location: Left arm, Patient Position: Sitting, BP Cuff Size: Adult)   Pulse 92   Temp 36.5 °C (97.7 °F) (Temporal)   Resp 16   Ht 1.753 m (5' 9\")   Wt 80.7 kg (178 lb)   SpO2 98%   BMI 26.29 kg/m²  Body mass index is 26.29 kg/m².     Physical Exam  Vitals reviewed.   Constitutional:       Appearance: Normal appearance.   HENT:      Right Ear: No mastoid tenderness. Tympanic membrane is injected. Tympanic membrane is not perforated, erythematous or retracted.      Left Ear: No mastoid tenderness. Tympanic membrane is injected. Tympanic membrane is not erythematous or retracted.      Nose: Congestion and rhinorrhea present. Rhinorrhea is clear.      Right Turbinates: Enlarged. Not swollen.      Left Turbinates: Enlarged. Not swollen.      Right Sinus: No maxillary sinus tenderness or frontal sinus tenderness.      Left Sinus: No maxillary sinus tenderness or frontal sinus tenderness.      Mouth/Throat:      Lips: Pink.      Mouth: Mucous membranes are moist.      Pharynx: Oropharynx is clear. Postnasal drip present.   Musculoskeletal:      Cervical back: Neck supple.   Lymphadenopathy:      Cervical: No cervical adenopathy.   Neurological:      Mental Status: He is alert.          Results for orders placed or " performed during the hospital encounter of 02/11/25   HEMOGLOBIN A1C    Collection Time: 02/11/25  8:30 AM   Result Value Ref Range    Glycohemoglobin 5.5 4.0 - 5.6 %    Est Avg Glucose 111 mg/dL      Assessment and Plan:     The following treatment plan was discussed through shared decision making with the patient:    1. Viral URI        2. Acne vulgaris  Clindamycin Phosphate (CLINDAMYCIN PHOS, TWICE-DAILY,) 1 % Gel      3. Prediabetes          Assessment & Plan  1. Viral vs Bacterial Sinusitis.  - Symptoms include runny nose, stuffy nose, sneezing, ear pressure, and throat phlegm. There is no fever or chills, and occasional cough is present.  Representing more of a viral etiology than bacterial.  Did discuss with patient that sometimes these viral infections can transform into bacterial.  If symptoms and getting worse over the weekend antibiotics have been sent to his pharmacy  - Examination revealed a little earwax and fluid behind the ear. The patient has been using Mucinex and DayQuil, which provide some relief.  - Advised to continue using Mucinex and DayQuil. Flonase nasal spray was recommended to help dry out the congestion and ear pressure. An antihistamine such as Zyrtec, Claritin, or Allegra should be taken daily for the next week.  - Doxycycline was prescribed as a contingency plan if symptoms worsen over the weekend.    2. Cystic acne.  - The patient experiences cystic acne, which flares up with certain foods like chocolate and coffee.  - Advised to use a cleanser containing salicylic acid or benzoyl peroxide.  - Clindamycin gel was prescribed for topical application twice daily.  - A referral to dermatology was offered but deferred by the patient.    3. Prediabetes.  - The patient's A1c levels have fluctuated, with the most recent reading at 5.5.  - Advised to focus on diet and maintain A1c levels below 5.7.  - A follow-up appointment with PCP, is scheduled for 07/2025 to discuss potential medication  options if necessary.  - Recommended to wait on further lab work until the follow-up appointment.    Health maintenance.  - The patient expressed concern about the need for a colonoscopy due to a family history of polyps and an early gallbladder removal.  - Advised to discuss this further with PCP, during the 07/2025 appointment.    Return in about 6 weeks (around 7/10/2025) for Labs, Med Check.       Please note that this note was created using dictation with voice recognition software. I have made every reasonable attempt to correct obvious errors, but I expect that there are errors of grammar and possibly content that I did not discover before finalizing the note.    JAMESON Le  [facility]  [facility]

## 2025-07-16 ENCOUNTER — OFFICE VISIT (OUTPATIENT)
Dept: MEDICAL GROUP | Facility: PHYSICIAN GROUP | Age: 40
End: 2025-07-16
Payer: COMMERCIAL

## 2025-07-16 VITALS
OXYGEN SATURATION: 95 % | SYSTOLIC BLOOD PRESSURE: 106 MMHG | TEMPERATURE: 98.5 F | DIASTOLIC BLOOD PRESSURE: 62 MMHG | RESPIRATION RATE: 17 BRPM | BODY MASS INDEX: 25.74 KG/M2 | HEART RATE: 87 BPM | HEIGHT: 69 IN | WEIGHT: 173.8 LBS

## 2025-07-16 DIAGNOSIS — H02.9 LESION OF LEFT UPPER EYELID: ICD-10-CM

## 2025-07-16 DIAGNOSIS — L70.0 ACNE VULGARIS: ICD-10-CM

## 2025-07-16 DIAGNOSIS — R51.9 FREQUENT HEADACHES: ICD-10-CM

## 2025-07-16 DIAGNOSIS — R73.03 PREDIABETES: ICD-10-CM

## 2025-07-16 DIAGNOSIS — Z12.11 COLON CANCER SCREENING: ICD-10-CM

## 2025-07-16 DIAGNOSIS — Z00.00 WELLNESS EXAMINATION: Primary | ICD-10-CM

## 2025-07-16 PROCEDURE — 99396 PREV VISIT EST AGE 40-64: CPT

## 2025-07-16 PROCEDURE — 3074F SYST BP LT 130 MM HG: CPT

## 2025-07-16 PROCEDURE — 3078F DIAST BP <80 MM HG: CPT

## 2025-07-16 RX ORDER — CLINDAMYCIN PHOSPHATE 10 MG/G
1 GEL TOPICAL 2 TIMES DAILY
Qty: 30 G | Refills: 0 | Status: SHIPPED | OUTPATIENT
Start: 2025-07-16

## 2025-07-16 ASSESSMENT — FIBROSIS 4 INDEX: FIB4 SCORE: 0.87

## 2025-07-16 ASSESSMENT — PATIENT HEALTH QUESTIONNAIRE - PHQ9: CLINICAL INTERPRETATION OF PHQ2 SCORE: 0

## 2025-07-16 NOTE — PATIENT INSTRUCTIONS
Start magnesium threonate 400mg gel cap by mouth every night, may take extra dose if needed for headache (over the counter), hold for diarrhea  Start Riboflavin (Vitamin B2) 400mg by mouth every night (over the counter),may turn urine bright yellow  Start COQ 10, take 300mg every night. (over the counter)  Attempt to go to bed and get up at the same time every night  Eat meals on regular basis  Stay hydrated.  Aerobic exercise 30 minutes daily  Avoid aged or smoked foods, avoid processed foods, red wine, aged cheese  Keep headache diary, include foods that you may have eaten.  Avoid overusing over the counter medications:  Do not take more than 500mg acetaminophen (tylenol), more than 4 times weekly, more frequent or larger doses are associated with medication overuse headache.    Size Of Lesion In Cm: 0.7

## 2025-07-16 NOTE — PROGRESS NOTES
Subjective:     CC:   Chief Complaint   Patient presents with    Annual Exam     C/o abput his a1c     I saw the patient with Gilda Barney, student. I performed a physical exam and medical decision making. I reviewed, verified, the documentation of 7/16/2025 and amended with the content and plan as written by the medical student.     HPI:   Kanu Castellanos is a 40 y.o. male who presents for an annual exam. He is feeling well.    Health Maintenance  Advanced directive: N/A   PT/vit D for falls prevention: N/A   Cholesterol Screening: Ordered  Diabetes Screening: Ordered   AAA Screening: N/A   Aspirin Use: N/A      Anticipatory Guidance  Diet: Protein/Fiber focused diet; Home cooked, occasional take out.   Exercise: 3x weekly: weight training/ walks   Substance Abuse: N/A   Safe in relationship.   Seat belts, bike helmet, gun safety discussed.  Sun protection used.    Cancer screening  Colorectal Cancer Screening: Ordered per pt request  Lung Cancer Screening: N/A    Prostate Cancer Screening/PSA: N/A   Infectious disease screening/Immunizations  --STI Screening: Deferred  --Practices safe sex.  --HIV Screening: Deferred   --Hepatitis C Screening: Deferred   --Immunizations:    Influenza: Deferred   HPV:  Deferred    Tetanus: Next due date: 1/11/2027    Shingles: N/A   Pneumococcal : Deferred         Other immunizations: N/A     He  has no past medical history on file.  He  has a past surgical history that includes cholecystectomy.  Family History   Problem Relation Age of Onset    Cancer Mother         ovarian    Cancer Paternal Uncle      Social History[1]    Patient Active Problem List    Diagnosis Date Noted    Rash 04/08/2024    Lesion of left upper eyelid 02/08/2023    Prediabetes 02/08/2023    Dyslipidemia 02/08/2023    Kidney stone 07/27/2022    Anxiety with flying 07/27/2022    Acne vulgaris 07/27/2022       Current Medications[2] (including changes today)  Allergies: Patient has no known  "allergies.    Review of Systems   Constitutional: Negative for fever, chills and malaise/fatigue.   HENT: Negative for congestion.    Eyes: Negative for pain. Right eyelid slight erythema/edema from previous chalazion.  Respiratory: Negative for cough and shortness of breath.    Cardiovascular: Negative for leg swelling.   Gastrointestinal: Negative for nausea, vomiting, abdominal pain and diarrhea.   Genitourinary: Negative for dysuria and hematuria.   Skin: Negative for rash. Occasional cystic acne.  Neurological: Negative for dizziness, focal weakness. Headaches 5 x /week w/ Excedrin use.  Endo/Heme/Allergies: Does not bruise/bleed easily.   Psychiatric/Behavioral: Negative for depression.  The patient is not nervous/anxious.      Objective:     /62 (BP Location: Left arm, Patient Position: Sitting)   Pulse 87   Temp 36.9 °C (98.5 °F) (Temporal)   Resp 17   Ht 1.753 m (5' 9\")   Wt 78.8 kg (173 lb 12.8 oz)   SpO2 95%   BMI 25.67 kg/m²   Body mass index is 25.67 kg/m².  Wt Readings from Last 4 Encounters:   07/16/25 78.8 kg (173 lb 12.8 oz)   05/29/25 80.7 kg (178 lb)   05/13/25 81.4 kg (179 lb 7.3 oz)   08/30/24 77.1 kg (170 lb)       Physical Exam:  Constitutional: Well-developed and well-nourished. Not diaphoretic. No distress.   Skin: Skin is warm and dry. No rash noted.  Head: Atraumatic without lesions.  Eyes: Conjunctivae and extraocular motions are normal. Pupils are equal, round, and reactive to light. No scleral icterus. Right eyelid slight erythema/edema from previous chalazion healing appropriately. No changes in vision.   Ears:  External ears unremarkable. Tympanic membranes clear and intact.  Nose: Nares patent. Septum midline. Turbinates without erythema nor edema. No discharge.   Mouth/Throat: Dentition is intact. Tongue normal. Oropharynx is clear and moist. Posterior pharynx without erythema or exudates.  Neck: Supple, trachea midline. Normal range of motion. No thyromegaly present. " No lymphadenopathy--cervical or supraclavicular.  Cardiovascular: Regular rate and rhythm, S1 and S2 without murmur, rubs, or gallops.    Lungs: Effort normal. Clear to auscultation throughout. No adventitious sounds. No CVA tenderness.  Abdomen: Soft, non tender, and without distention. Active bowel sounds in all four quadrants. No rebound, guarding, masses.  : Deferred  Rectal: deferred  Prostate: deferred  Extremities: No cyanosis, clubbing, erythema, nor edema. Distal pulses intact and symmetric.   Musculoskeletal: All major joints AROM full in all directions without pain.  Neurological: Alert and oriented x 3. DTRs 2+/3 and symmetric. No cranial nerve deficit. 5/5 myotomes. Sensation intact.  Psychiatric:  Behavior, mood, and affect are appropriate.    A chaperone was offered to the patient during today's exam. Patient declined chaperone.    Assessment and Plan:     Problem List Items Addressed This Visit       Acne vulgaris    Relevant Medications    Clindamycin Phosphate (CLINDAMYCIN PHOS, TWICE-DAILY,) 1 % Gel    Lesion of left upper eyelid    Prediabetes (Chronic)    Relevant Orders    HEMOGLOBIN A1C    HEMOGLOBIN A1C     Other Visit Diagnoses         Wellness examination    -  Primary    Relevant Orders    CBC WITHOUT DIFFERENTIAL    Lipid Profile    TSH WITH REFLEX TO FT4    Comp Metabolic Panel      Frequent headaches          Colon cancer screening        Relevant Orders    Referral to Gastroenterology          Counseled on medication overuse headaches, recommend reducing Excedrin use and caffeine.   Educated to use clindamycin consistently for acne.   Monitor chalazion on upper eyelid. If no improvement in 4 weeks, send to Dr. Mercado for consult.     HCM: UTD.  Labs per orders.  Vaccinations per orders.  Counseling about diet, supplements, exercise, skin care and safe sex.    Follow-up: Return in about 1 year (around 7/16/2026) for AWV, sooner pending results.           [1]   Social History  Tobacco Use     Smoking status: Former     Current packs/day: 0.00     Types: Cigarettes     Quit date:      Years since quittin.5    Smokeless tobacco: Never   Vaping Use    Vaping status: Former   Substance Use Topics    Alcohol use: Not Currently    Drug use: Never   [2]   Current Outpatient Medications   Medication Sig Dispense Refill    Clindamycin Phosphate (CLINDAMYCIN PHOS, TWICE-DAILY,) 1 % Gel Apply 1 Application topically 2 times a day. 30 g 0     No current facility-administered medications for this visit.

## 2025-07-21 NOTE — Clinical Note
REFERRAL APPROVAL NOTICE         Sent on July 21, 2025                   Kanu Castellanos  [address] [facility] [address]                   Dear Mr. Castellanos,    After a careful review of the medical information and benefit coverage, [facility] has processed your referral. See below for additional details.    If applicable, you must be actively enrolled with your insurance for coverage of the authorized service. If you have any questions regarding your coverage, please contact your insurance directly.    REFERRAL INFORMATION   Referral #:  [id number]  Referred-To Provider    Referred-By Provider:  Gastroenterology    DERICK Haile   [facility]      [address]  [facility] [id number] [phone] [address] [facility] [address]  [phone]    Referral Start Date:  07/16/2025  Referral End Date:   07/16/2026             SCHEDULING  If you do not already have an appointment, please call [phone] to make an appointment.     MORE INFORMATION  If you do not already have a [employer] account, sign up at: [email or web address]  You can access your medical information, make appointments, see lab results, billing information, and more.  If you have questions regarding this referral, please contact  the [facility] Referrals department at:             [phone]. Monday - Friday 8:00AM - 5:00PM.     Sincerely,    [facility]